# Patient Record
Sex: FEMALE | Race: WHITE | HISPANIC OR LATINO | Employment: FULL TIME | URBAN - METROPOLITAN AREA
[De-identification: names, ages, dates, MRNs, and addresses within clinical notes are randomized per-mention and may not be internally consistent; named-entity substitution may affect disease eponyms.]

---

## 2022-02-14 PROBLEM — Z86.0100 HISTORY OF COLON POLYPS: Status: ACTIVE | Noted: 2022-02-14

## 2024-07-15 ENCOUNTER — PATIENT MESSAGE (OUTPATIENT)
Dept: PODIATRY | Facility: CLINIC | Age: 59
End: 2024-07-15
Payer: COMMERCIAL

## 2024-07-17 ENCOUNTER — TELEPHONE (OUTPATIENT)
Dept: PODIATRY | Facility: CLINIC | Age: 59
End: 2024-07-17
Payer: COMMERCIAL

## 2024-07-17 ENCOUNTER — CLINICAL SUPPORT (OUTPATIENT)
Dept: REHABILITATION | Facility: HOSPITAL | Age: 59
End: 2024-07-17
Payer: COMMERCIAL

## 2024-07-17 DIAGNOSIS — Z74.09 IMPAIRED FUNCTIONAL MOBILITY AND ACTIVITY TOLERANCE: Primary | ICD-10-CM

## 2024-07-17 PROCEDURE — 97161 PT EVAL LOW COMPLEX 20 MIN: CPT | Mod: PN

## 2024-07-17 PROCEDURE — 97530 THERAPEUTIC ACTIVITIES: CPT | Mod: PN

## 2024-07-17 PROCEDURE — 97140 MANUAL THERAPY 1/> REGIONS: CPT | Mod: PN

## 2024-07-17 NOTE — PLAN OF CARE
[facility]   Physical Therapy Initial Evaluation      Name: Amanda Orellana  Clinic Number: [id number]    Therapy Diagnosis: No diagnosis found.     Physician: Tish Blue DPM    Physician Orders: PT Eval and Treat   Medical Diagnosis from Referral: M72.2 (ICD-10-CM) - Plantar fasciitis   Evaluation Date: 2024  Authorization Period Expiration: 2024  Plan of Care Expiration: 2024  Progress Note Due: 2024  Date of Surgery: none   Visit # / Visits authorized:    FOTO:     Precautions: Standard   *Video interpretor services neccessary*    Time In: 9:15 am   Time Out: 10:00 am   Total Billable Time: 45 minutes    Subjective     Date of onset: 6 months     History of current condition - Megan reports history of left foot pain for the past 6 months. Significant increase in left foot pain over the past month. Localizes pain to bilateral plantar aspect of heels (L>R). Gets pain with immediate weight-bearing in the morning. She wears [employer] tennis shoes with insoles at work and sandals at home. Walks around bare foot very little. Has tried exercises with no relief, only got relief when she came to therapy a year ago.     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     Medical History:   Past Medical History:   Diagnosis Date    Postmenopausal bleeding 2021    Thyroid disease        Surgical History:   Amanda Orellana  has a past surgical history that includes  section, low transverse; Cholecystectomy; and  "Axillary Surgery.    Medications:   Amanda has a current medication list which includes the following prescription(s): gabapentin, levothyroxine, mupirocin, and omeprazole.    Allergies:   Review of patient's allergies indicates:   Allergen Reactions    Vitamin a Rash        Objective        Palpation: TTP cacaneal tubercle bilateral; and left medial aspect of ankle.    Posture: Pes planus; calcaneal valgus     Gross Movement Analysis:  - Gait:  denied gait assessment due to pain    Range of Motion:   LE Right (Active/Passive) Left (Active/Passive)   Hip flexion WNL WNL   Hip abduction WNL WNL   Hip ER WNL WNL   Knee Flexion WNL WNL   Knee Extension WNL WNL     Ankle  Right  Left Pain/Dysfunction with Movement    AROM PROM AROM PROM WNL   Plantarflexion (50 deg) 50 NT 45 NT    Dorsiflexion (20 deg) 9 NT 10 NT    Inversion (20-30 deg) 43 NT 42 NT    Eversion (5-10 deg) 28 NT 28 NT        Lower Extremity Strength                 LE           Right           Left   Dorsiflexion 5/5 5/5   Plantarflexion 5/5 5/5   Inversion 4/5 4/5   Eversion  4+/5 4+/5   Great Toe 5/5 5/5     Special Tests:                             Right           Left   Single leg heel raise 13x 6x   Windlass Test  (-) (+)         Intake Outcome Measure for FOTO Foot Survey    Therapist reviewed FOTO scores for Amanda Orellana on 7/17/2024.   FOTO report - see Media section or FOTO account episode details.    Intake Score: 65%         Treatment     Total Treatment time (time-based codes) separate from Evaluation: 25 minutes     Megan received the treatments listed below:      therapeutic activities to improve functional performance for 15  minutes, including:  Gastroc stretch with towel: 3x30"  Soleus stretch: 3x30"   Plantar fascia stretch: 3x30"   Seated heel raises: 15x   Water bottle roll outs: demonstrated  Education on proper shoe wear, medial arch supports, and avoiding ambulating bare foot around home  Provided brands with orthotic " sandals for home use.    manual therapy techniques: Joint mobilizations and Soft tissue Mobilization were applied for 10 minutes, including:  fat pad and plantar fascia taping    Patient Education and Home Exercises     Education provided:   - Findings; prognosis and plan of care  - Home exercise program  - Modality options  - Therapist contact information      Written Home Exercises Provided: yes.  Exercises were reviewed and Megan was able to demonstrate them prior to the end of the session.  Megan demonstrated good understanding of the education provided. See EMR under Patient Instructions for exercises provided during therapy sessions.    Assessment     Amanda is a 59 y.o. female referred to outpatient Physical Therapy with a medical diagnosis of M72.2 (ICD-10-CM) - Plantar fasciitis. Patient presents with signs and symptoms consistent with medical diagnosis. All of today's session performed with the assistance of video interpretor.  Bilateral pes planus noted in stance. Increased daily job demands contributing to severity of pain levels despite appropriate shoe wear. Suggested over the counter medial arch supports. Milder pain levels following plantar fascia and fat pad taping.    Patient prognosis is Fair.   Patient will benefit from skilled outpatient Physical Therapy to address the deficits stated above and in the chart below, provide patient /family education, and to maximize patientt's level of independence.     Plan of care discussed with patient: yes  Patient's spiritual, cultural and educational needs considered and patient is agreeable to the plan of care and goals as stated below:     Anticipated Barriers for therapy: chronicity, job demands     Medical Necessity is demonstrated by the following  History  Co-morbidities and personal factors that may impact the plan of care [] LOW: no personal factors / co-morbidities  [x] MODERATE: 1-2 personal factors / co-morbidities  [] HIGH: 3+ personal factors /  co-morbidities    Moderate / High Support Documentation:   Co-morbidities affecting plan of care: thyroid disease     Personal Factors:   coping style  social background  lifestyle  character     Examination  Body Structures and Functions, activity limitations and participation restrictions that may impact the plan of care [] LOW: addressing 1-2 elements  [x] MODERATE: 3+ elements  [] HIGH: 4+ elements (please support below)    Moderate / High Support Documentation: Based on PMHX, co morbidities , data from assessments and functional level of assistance required with task and clinical presentation directly impacting function.         Clinical Presentation [x] LOW: stable  [] MODERATE: Evolving  [] HIGH: Unstable     Decision Making/ Complexity Score: low       Goals:  Short Term Goals (2 Weeks):  1. Patient will be compliant with home exercise program to supplement therapy in restoring pain free function.  2. Patient will improve impaired lower extremity manual muscle tests to >/= 4/5 to improve dynamic hip/knee support for functional tasks.  3. Patient will report daily use of medial arch supports to improve tolerance to standing/walking activities.  4. Patient will report pain upon rising in the morning </= 2/10 to improve quality of life.    Long Term Goals (4 Weeks):  1. Patient will improve FOTO score to </= 43% limited to decrease perceived limitation with mobility.   2. Patient will improve impaired lower extremity manual muscle tests to >/= 4+/5 to improve dynamic hip/knee support for functional tasks.  3. Patient will be able to complete 6 MWT within age-expected norms to return to prior level of function.  4. Patient will demonstrate single-leg heel raise > 10 reps without fatigue for improved standing/walking tolerance.      Plan     Plan of care Certification: 7/17/2024 to 8/14/2024.    Outpatient Physical Therapy 2 times weekly for 4 weeks to include the following interventions: Gait Training, Manual  Therapy, Moist Heat/ Ice, Neuromuscular Re-ed, Patient Education, Self Care, Therapeutic Activities, and Therapeutic Exercise.     Sola Singh, PT        Physician's Signature: _________________________________________ Date: ________________

## 2024-07-17 NOTE — TELEPHONE ENCOUNTER
Called patient to get her rescheduled for tmw patient said she will call back & reschedule at a different time

## 2024-07-17 NOTE — TELEPHONE ENCOUNTER
Attempted to reach Ms Orellana to inform her that Dr Blue will be in emergency surgery tomorrow, but she was unavailable. Left voice message encouraging call back with assistance to reschedule her appt.

## 2024-07-23 ENCOUNTER — LAB VISIT (OUTPATIENT)
Dept: LAB | Facility: HOSPITAL | Age: 59
End: 2024-07-23
Attending: FAMILY MEDICINE
Payer: COMMERCIAL

## 2024-07-23 ENCOUNTER — PATIENT MESSAGE (OUTPATIENT)
Dept: ADMINISTRATIVE | Facility: OTHER | Age: 59
End: 2024-07-23
Payer: COMMERCIAL

## 2024-07-23 ENCOUNTER — OFFICE VISIT (OUTPATIENT)
Dept: FAMILY MEDICINE | Facility: CLINIC | Age: 59
End: 2024-07-23
Payer: COMMERCIAL

## 2024-07-23 VITALS
WEIGHT: 172.63 LBS | DIASTOLIC BLOOD PRESSURE: 82 MMHG | SYSTOLIC BLOOD PRESSURE: 120 MMHG | BODY MASS INDEX: 32.59 KG/M2 | HEIGHT: 61 IN | OXYGEN SATURATION: 96 % | HEART RATE: 85 BPM

## 2024-07-23 DIAGNOSIS — Z00.01 ENCOUNTER FOR GENERAL ADULT MEDICAL EXAMINATION WITH ABNORMAL FINDINGS: ICD-10-CM

## 2024-07-23 DIAGNOSIS — Z12.31 ENCOUNTER FOR SCREENING MAMMOGRAM FOR BREAST CANCER: ICD-10-CM

## 2024-07-23 DIAGNOSIS — R14.0 ABDOMINAL BLOATING: ICD-10-CM

## 2024-07-23 DIAGNOSIS — Z86.010 HISTORY OF COLON POLYPS: ICD-10-CM

## 2024-07-23 DIAGNOSIS — R13.10 DYSPHAGIA, UNSPECIFIED TYPE: ICD-10-CM

## 2024-07-23 DIAGNOSIS — Z00.01 ENCOUNTER FOR GENERAL ADULT MEDICAL EXAMINATION WITH ABNORMAL FINDINGS: Primary | ICD-10-CM

## 2024-07-23 DIAGNOSIS — K21.9 GASTROESOPHAGEAL REFLUX DISEASE WITHOUT ESOPHAGITIS: ICD-10-CM

## 2024-07-23 DIAGNOSIS — E03.9 ACQUIRED HYPOTHYROIDISM: ICD-10-CM

## 2024-07-23 DIAGNOSIS — K59.09 CHRONIC CONSTIPATION: ICD-10-CM

## 2024-07-23 DIAGNOSIS — R73.03 PREDIABETES: ICD-10-CM

## 2024-07-23 DIAGNOSIS — Z12.11 COLON CANCER SCREENING: ICD-10-CM

## 2024-07-23 DIAGNOSIS — E66.09 CLASS 1 OBESITY DUE TO EXCESS CALORIES WITH SERIOUS COMORBIDITY AND BODY MASS INDEX (BMI) OF 32.0 TO 32.9 IN ADULT: ICD-10-CM

## 2024-07-23 DIAGNOSIS — E78.2 MODERATE MIXED HYPERLIPIDEMIA NOT REQUIRING STATIN THERAPY: ICD-10-CM

## 2024-07-23 LAB
BASOPHILS # BLD AUTO: 0.04 K/UL (ref 0–0.2)
BASOPHILS NFR BLD: 0.5 % (ref 0–1.9)
DIFFERENTIAL METHOD BLD: ABNORMAL
EOSINOPHIL # BLD AUTO: 0.2 K/UL (ref 0–0.5)
EOSINOPHIL NFR BLD: 2 % (ref 0–8)
ERYTHROCYTE [DISTWIDTH] IN BLOOD BY AUTOMATED COUNT: 13.2 % (ref 11.5–14.5)
ESTIMATED AVG GLUCOSE: 114 MG/DL (ref 68–131)
HBA1C MFR BLD: 5.6 % (ref 4–5.6)
HCT VFR BLD AUTO: 42.2 % (ref 37–48.5)
HGB BLD-MCNC: 13.3 G/DL (ref 12–16)
IMM GRANULOCYTES # BLD AUTO: 0.03 K/UL (ref 0–0.04)
IMM GRANULOCYTES NFR BLD AUTO: 0.4 % (ref 0–0.5)
LYMPHOCYTES # BLD AUTO: 2.3 K/UL (ref 1–4.8)
LYMPHOCYTES NFR BLD: 30.9 % (ref 18–48)
MCH RBC QN AUTO: 28.2 PG (ref 27–31)
MCHC RBC AUTO-ENTMCNC: 31.5 G/DL (ref 32–36)
MCV RBC AUTO: 89 FL (ref 82–98)
MONOCYTES # BLD AUTO: 0.8 K/UL (ref 0.3–1)
MONOCYTES NFR BLD: 10.3 % (ref 4–15)
NEUTROPHILS # BLD AUTO: 4.2 K/UL (ref 1.8–7.7)
NEUTROPHILS NFR BLD: 55.9 % (ref 38–73)
NRBC BLD-RTO: 0 /100 WBC
PLATELET # BLD AUTO: 296 K/UL (ref 150–450)
PMV BLD AUTO: 10.9 FL (ref 9.2–12.9)
RBC # BLD AUTO: 4.72 M/UL (ref 4–5.4)
WBC # BLD AUTO: 7.47 K/UL (ref 3.9–12.7)

## 2024-07-23 PROCEDURE — 84443 ASSAY THYROID STIM HORMONE: CPT | Performed by: FAMILY MEDICINE

## 2024-07-23 PROCEDURE — 3074F SYST BP LT 130 MM HG: CPT | Mod: CPTII,S$GLB,, | Performed by: FAMILY MEDICINE

## 2024-07-23 PROCEDURE — 83036 HEMOGLOBIN GLYCOSYLATED A1C: CPT | Performed by: FAMILY MEDICINE

## 2024-07-23 PROCEDURE — 99396 PREV VISIT EST AGE 40-64: CPT | Mod: S$GLB,,, | Performed by: FAMILY MEDICINE

## 2024-07-23 PROCEDURE — 85025 COMPLETE CBC W/AUTO DIFF WBC: CPT | Performed by: FAMILY MEDICINE

## 2024-07-23 PROCEDURE — 1159F MED LIST DOCD IN RCRD: CPT | Mod: CPTII,S$GLB,, | Performed by: FAMILY MEDICINE

## 2024-07-23 PROCEDURE — 80061 LIPID PANEL: CPT | Performed by: FAMILY MEDICINE

## 2024-07-23 PROCEDURE — 86364 TISS TRNSGLTMNASE EA IG CLAS: CPT | Mod: 59 | Performed by: FAMILY MEDICINE

## 2024-07-23 PROCEDURE — 36415 COLL VENOUS BLD VENIPUNCTURE: CPT | Mod: PO | Performed by: FAMILY MEDICINE

## 2024-07-23 PROCEDURE — 86258 DGP ANTIBODY EACH IG CLASS: CPT | Mod: 59 | Performed by: FAMILY MEDICINE

## 2024-07-23 PROCEDURE — 82784 ASSAY IGA/IGD/IGG/IGM EACH: CPT | Performed by: FAMILY MEDICINE

## 2024-07-23 PROCEDURE — 82247 BILIRUBIN TOTAL: CPT | Performed by: FAMILY MEDICINE

## 2024-07-23 PROCEDURE — 99213 OFFICE O/P EST LOW 20 MIN: CPT | Mod: 25,S$GLB,, | Performed by: FAMILY MEDICINE

## 2024-07-23 PROCEDURE — 80069 RENAL FUNCTION PANEL: CPT | Performed by: FAMILY MEDICINE

## 2024-07-23 PROCEDURE — 1160F RVW MEDS BY RX/DR IN RCRD: CPT | Mod: CPTII,S$GLB,, | Performed by: FAMILY MEDICINE

## 2024-07-23 PROCEDURE — 3079F DIAST BP 80-89 MM HG: CPT | Mod: CPTII,S$GLB,, | Performed by: FAMILY MEDICINE

## 2024-07-23 PROCEDURE — 99999 PR PBB SHADOW E&M-EST. PATIENT-LVL IV: CPT | Mod: PBBFAC,,, | Performed by: FAMILY MEDICINE

## 2024-07-23 PROCEDURE — 3008F BODY MASS INDEX DOCD: CPT | Mod: CPTII,S$GLB,, | Performed by: FAMILY MEDICINE

## 2024-07-23 PROCEDURE — 84460 ALANINE AMINO (ALT) (SGPT): CPT | Performed by: FAMILY MEDICINE

## 2024-07-23 RX ORDER — OMEPRAZOLE 40 MG/1
40 CAPSULE, DELAYED RELEASE ORAL DAILY
Qty: 90 CAPSULE | Refills: 1 | Status: SHIPPED | OUTPATIENT
Start: 2024-07-23

## 2024-07-23 RX ORDER — ERGOCALCIFEROL 1.25 MG/1
50000 CAPSULE ORAL
COMMUNITY
Start: 2024-06-17

## 2024-07-23 RX ORDER — PLECANATIDE 3 MG/1
3 TABLET ORAL DAILY
Qty: 90 TABLET | Refills: 3 | Status: SHIPPED | OUTPATIENT
Start: 2024-07-23

## 2024-07-23 NOTE — PROGRESS NOTES
"Subjective:         Patient ID: Megan Hurtado is a 59 y.o. female.    Chief Complaint: Abdominal Pain and Insomnia    Patient Active Problem List   Diagnosis    Acquired hypothyroidism    GERD without esophagitis    History of colon polyps    Prediabetes    Moderate mixed hyperlipidemia not requiring statin therapy    Adjustment disorder with depressed mood    Female pattern hair loss    History of basal cell carcinoma (BCC) of skin    Chronic bilateral low back pain with right-sided sciatica    Calcaneal spur of foot, right    Right foot pain    Impaired gait and mobility    Impaired functional mobility and activity tolerance      Abdominal Pain      Megan is a 59 y.o. female who presents today for annual exam.   Reports that she has epigastric pain and bloating starting in the morning after drinking liquids for the past 5 months. Reports sometimes trouble swallowing. Prior EGD unremarkable in the remote past.   Fullness and bloating and just feeling like her abd pannus is larger and more pronounced.   S/p cholecystectomy    Review of Systems   Gastrointestinal:  Positive for abdominal pain.   All other systems reviewed and are negative.       Objective:     Vitals:    07/23/24 1105   BP: 120/82   BP Location: Left arm   Patient Position: Sitting   BP Method: Large (Manual)   Pulse: 85   SpO2: 96%   Weight: 78.3 kg (172 lb 9.9 oz)   Height: 5' 1" (1.549 m)         Physical Exam  Vitals and nursing note reviewed.   Constitutional:       General: She is not in acute distress.     Appearance: Normal appearance. She is not ill-appearing, toxic-appearing or diaphoretic.   HENT:      Head: Normocephalic and atraumatic.   Eyes:      General: No scleral icterus.     Conjunctiva/sclera: Conjunctivae normal.   Cardiovascular:      Rate and Rhythm: Normal rate.      Heart sounds: Normal heart sounds. No murmur heard.  Pulmonary:      Effort: Pulmonary effort is normal. No respiratory distress.      Breath sounds: Normal breath " sounds.   Abdominal:      General: Bowel sounds are normal.   Skin:     Coloration: Skin is not pale.   Neurological:      Mental Status: She is alert. Mental status is at baseline.   Psychiatric:         Attention and Perception: Attention and perception normal.         Mood and Affect: Mood and affect normal.         Speech: Speech normal.         Behavior: Behavior normal.         Cognition and Memory: Cognition and memory normal.         Judgment: Judgment normal.       Assessment:       1. Encounter for general adult medical examination with abnormal findings    2. Moderate mixed hyperlipidemia not requiring statin therapy    3. Gastroesophageal reflux disease without esophagitis    4. Dysphagia, unspecified type    5. Abdominal bloating    6. Chronic constipation    7. Prediabetes    8. Acquired hypothyroidism    9. History of colon polyps    10. Colon cancer screening    11. Encounter for screening mammogram for breast cancer    12. Class 1 obesity due to excess calories with serious comorbidity and body mass index (BMI) of 32.0 to 32.9 in adult          Plan:   Recent relevant labs results reviewed with patient.         1. Encounter for general adult medical examination with abnormal findings  -     Hepatic Function Panel; Future; Expected date: 07/23/2024  -     Renal Function Panel; Future; Expected date: 07/23/2024  -     Lipid Panel; Future; Expected date: 07/23/2024  -     TSH; Future; Expected date: 07/23/2024  -     Hemoglobin A1C; Future; Expected date: 07/23/2024  -     CBC Auto Differential; Future; Expected date: 07/23/2024  -     Hepatic Function Panel; Future; Expected date: 07/23/2025  -     Renal Function Panel; Future; Expected date: 07/23/2025  -     Lipid Panel; Future; Expected date: 07/23/2025  -     TSH; Future; Expected date: 07/23/2025  -     Hemoglobin A1C; Future; Expected date: 07/23/2025  -     CBC Auto Differential; Future; Expected date: 07/23/2025  - Risk and age appropriate  anticipatory guidance.  reviewed and updated. Recommendations discussed with patient as appropriate.     2. Moderate mixed hyperlipidemia not requiring statin therapy  -     Lipid Panel; Future; Expected date: 07/23/2025  Recheck    3. Gastroesophageal reflux disease without esophagitis  -     omeprazole (PRILOSEC) 40 MG capsule; Take 1 capsule (40 mg total) by mouth once daily.  Dispense: 90 capsule; Refill: 1  -     Ambulatory referral/consult to Gastroenterology; Future; Expected date: 07/30/2024  Increase to 40 mg    4. Dysphagia, unspecified type  -     omeprazole (PRILOSEC) 40 MG capsule; Take 1 capsule (40 mg total) by mouth once daily.  Dispense: 90 capsule; Refill: 1  -     Ambulatory referral/consult to Gastroenterology; Future; Expected date: 07/30/2024    5. Abdominal bloating  -     omeprazole (PRILOSEC) 40 MG capsule; Take 1 capsule (40 mg total) by mouth once daily.  Dispense: 90 capsule; Refill: 1  -     Celiac Disease Panel; Future; Expected date: 07/23/2024    6. Chronic constipation  -     plecanatide (TRULANCE) 3 mg Tab; Take 3 mg by mouth once daily. For chronic constipation  Dispense: 90 tablet; Refill: 3    7. Prediabetes  -     Hemoglobin A1C; Future; Expected date: 07/23/2025    8. Acquired hypothyroidism  -     TSH; Future; Expected date: 07/23/2025    9. History of colon polyps  10. Colon cancer screening  May need repeat colonoscopy    11. Encounter for screening mammogram for breast cancer  -     Mammo Digital Screening Bilat w/ Margarito; Future; Expected date: 07/23/2024    Patient's questions answered. Plan reviewed with patient at the end of visit. Relevant precautions to chief complaint and reasons to seek further medical care or to contact the office sooner reviewed with patient.     Follow up in about 1 year (around 7/23/2025) for Annual Exam (prelabs).

## 2024-07-24 ENCOUNTER — CLINICAL SUPPORT (OUTPATIENT)
Dept: REHABILITATION | Facility: HOSPITAL | Age: 59
End: 2024-07-24
Payer: COMMERCIAL

## 2024-07-24 ENCOUNTER — TELEPHONE (OUTPATIENT)
Dept: GASTROENTEROLOGY | Facility: CLINIC | Age: 59
End: 2024-07-24
Payer: COMMERCIAL

## 2024-07-24 DIAGNOSIS — R26.89 IMPAIRED GAIT AND MOBILITY: Primary | ICD-10-CM

## 2024-07-24 DIAGNOSIS — Z74.09 IMPAIRED FUNCTIONAL MOBILITY AND ACTIVITY TOLERANCE: ICD-10-CM

## 2024-07-24 LAB
ALBUMIN SERPL BCP-MCNC: 4 G/DL (ref 3.5–5.2)
ALBUMIN SERPL BCP-MCNC: 4 G/DL (ref 3.5–5.2)
ALP SERPL-CCNC: 86 U/L (ref 55–135)
ALT SERPL W/O P-5'-P-CCNC: 19 U/L (ref 10–44)
ANION GAP SERPL CALC-SCNC: 8 MMOL/L (ref 8–16)
AST SERPL-CCNC: 21 U/L (ref 10–40)
BILIRUB DIRECT SERPL-MCNC: 0.1 MG/DL (ref 0.1–0.3)
BILIRUB SERPL-MCNC: 0.3 MG/DL (ref 0.1–1)
BUN SERPL-MCNC: 6 MG/DL (ref 6–20)
CALCIUM SERPL-MCNC: 9.1 MG/DL (ref 8.7–10.5)
CHLORIDE SERPL-SCNC: 104 MMOL/L (ref 95–110)
CHOLEST SERPL-MCNC: 180 MG/DL (ref 120–199)
CHOLEST/HDLC SERPL: 4.4 {RATIO} (ref 2–5)
CO2 SERPL-SCNC: 28 MMOL/L (ref 23–29)
CREAT SERPL-MCNC: 0.7 MG/DL (ref 0.5–1.4)
EST. GFR  (NO RACE VARIABLE): >60 ML/MIN/1.73 M^2
GLUCOSE SERPL-MCNC: 86 MG/DL (ref 70–110)
HDLC SERPL-MCNC: 41 MG/DL (ref 40–75)
HDLC SERPL: 22.8 % (ref 20–50)
LDLC SERPL CALC-MCNC: 93 MG/DL (ref 63–159)
NONHDLC SERPL-MCNC: 139 MG/DL
PHOSPHATE SERPL-MCNC: 3.6 MG/DL (ref 2.7–4.5)
POTASSIUM SERPL-SCNC: 4.1 MMOL/L (ref 3.5–5.1)
PROT SERPL-MCNC: 7.4 G/DL (ref 6–8.4)
SODIUM SERPL-SCNC: 140 MMOL/L (ref 136–145)
TRIGL SERPL-MCNC: 230 MG/DL (ref 30–150)
TSH SERPL DL<=0.005 MIU/L-ACNC: 1.84 UIU/ML (ref 0.4–4)

## 2024-07-24 PROCEDURE — 97110 THERAPEUTIC EXERCISES: CPT | Mod: PN

## 2024-07-24 PROCEDURE — 97112 NEUROMUSCULAR REEDUCATION: CPT | Mod: PN

## 2024-07-24 NOTE — TELEPHONE ENCOUNTER
"Left VM for patient to call the office back.       ----- Message from China Hernández sent at 7/24/2024  1:38 PM CDT -----  Good afternoon  Patient with a referral to Gastroenterology from Dr Mary Hart.  Diagnosis "Gastroesophageal reflux disease without esophagitis [K21.9]Dysphagia, unspecified type "  Patient ep with Dr Conn, please assist scheduling patient  Thanks    [place]  "

## 2024-07-24 NOTE — PROGRESS NOTES
"[facility]   Physical Therapy Treatment Note      Name: Megan Hurtado  Clinic Number: [id number]    Therapy Diagnosis:   Encounter Diagnoses   Name Primary?    Impaired gait and mobility Yes    Impaired functional mobility and activity tolerance      Physician: Tish Blue DPM    Visit Date: 7/24/2024    Physician Orders: PT Eval and Treat   Medical Diagnosis from Referral: M72.2 (ICD-10-CM) - Plantar fasciitis   Evaluation Date: 7/17/2024  Authorization Period Expiration: 12/31/2024  Plan of Care Expiration: 8/14/2024  Progress Note Due: 7/31/2024  Date of Surgery: none   Visit # / Visits authorized: 1/ 20   FOTO: 1/5     Precautions: Standard   *Video interpretor services neccessary*     Time In: 5:00 pm   Time Out: 5: 55 pm   Total Billable Time: 55 minutes  PTA Visit #: 0/5       Subjective     Patient reports: Feeling slightly better since initial evaluation. Medial arch supports helping in work shoes. Wants to get another pain for her [employer] tennis shoes.     She was compliant with home exercise program.  Response to previous treatment: initial evaluation last visit   Functional change: ongoing     Pain: 5/10  Location: left heel and plantar fascia     Objective      Objective Measures updated at progress report unless specified.     Treatment     Megan received the treatments listed below:      therapeutic exercises to develop strength, endurance, ROM, and flexibility for 23 minutes including:  Lacrosse ball roll outs: 1 minute - left   Gastroc stretch: 5x30" left   Soleus stretch:5x30"  Towel scrunches: 2 minutes     manual therapy techniques: taping were applied for 3 minutes, including:    fat pad and plantar fascia taping     neuromuscular re-education activities to improve: Coordination, Kinesthetic, Sense, and Proprioception for 29 minutes. The following activities were included:    Arch domes: 20x5"   Ankle plantar flexion: blue theraband 3x10  Figure 4 ankle inversion: " 3x10   Heel raises: 3x10   Tennis ball heel raises: 3x10  Cybex leg press: 7 plates 3x10    Patient Education and Home Exercises       Education provided:   - compliance with home exercise program   - avoid bare foot in home     Written Home Exercises Provided: yes. Exercises were reviewed and Megan was able to demonstrate them prior to the end of the session.  Megan demonstrated good  understanding of the education provided. See Electronic Medical Record under Patient Instructions for exercises provided during therapy sessions    Assessment   Amanda is a 59 y.o. female referred to outpatient Physical Therapy with a medical diagnosis of M72.2 (ICD-10-CM) - Plantar fasciitis. Patient presents with signs and symptoms consistent with medical diagnosis. Slight improvement in left heel pain with addition of medial arch support in work shoe wear. First follow up consisted of foot intrinsic neuromuscular control, anterior tibialis strengthening, and gastroc-soleus strengthening. Good tolerance throughout. Plantar fascia and fat pad taping technique applied end of session.    Megan Is progressing well towards her goals.   Patient prognosis is Fair.     Patient will continue to benefit from skilled outpatient physical therapy to address the deficits listed in the problem list box on initial evaluation, provide pt/family education and to maximize pt's level of independence in the home and community environment.     Patient's spiritual, cultural and educational needs considered and pt agreeable to plan of care and goals.     Anticipated barriers to physical therapy: chronicity, job demands     Goals:   Short Term Goals (2 Weeks):  1. Patient will be compliant with home exercise program to supplement therapy in restoring pain free function.  2. Patient will improve impaired lower extremity manual muscle tests to >/= 4/5 to improve dynamic hip/knee support for functional tasks.  3. Patient will report daily use of medial arch  supports to improve tolerance to standing/walking activities.  4. Patient will report pain upon rising in the morning </= 2/10 to improve quality of life.     Long Term Goals (4 Weeks):  1. Patient will improve FOTO score to </= 43% limited to decrease perceived limitation with mobility.   2. Patient will improve impaired lower extremity manual muscle tests to >/= 4+/5 to improve dynamic hip/knee support for functional tasks.  3. Patient will be able to complete 6 MWT within age-expected norms to return to prior level of function.  4. Patient will demonstrate single-leg heel raise > 10 reps without fatigue for improved standing/walking tolerance.      Plan     taping prn  Triad PF stretches (gastroc, soleus, PF)  Foot intrinsic neuromuscular control   gastroc-soleus strengthening     Sola Singh, PT

## 2024-07-26 LAB
GLIADIN PEPTIDE IGA SER-ACNC: 3.4 U/ML
GLIADIN PEPTIDE IGG SER-ACNC: <0.6 U/ML
IGA SERPL-MCNC: 382 MG/DL (ref 70–400)
TTG IGA SER-ACNC: 0.9 U/ML
TTG IGG SER-ACNC: 0.6 U/ML

## 2024-07-30 ENCOUNTER — CLINICAL SUPPORT (OUTPATIENT)
Dept: REHABILITATION | Facility: HOSPITAL | Age: 59
End: 2024-07-30
Payer: COMMERCIAL

## 2024-07-30 DIAGNOSIS — Z74.09 IMPAIRED FUNCTIONAL MOBILITY AND ACTIVITY TOLERANCE: ICD-10-CM

## 2024-07-30 DIAGNOSIS — R26.89 IMPAIRED GAIT AND MOBILITY: Primary | ICD-10-CM

## 2024-07-30 PROCEDURE — 97140 MANUAL THERAPY 1/> REGIONS: CPT | Mod: PN,CQ

## 2024-07-30 PROCEDURE — 97110 THERAPEUTIC EXERCISES: CPT | Mod: PN,CQ

## 2024-07-30 PROCEDURE — 97112 NEUROMUSCULAR REEDUCATION: CPT | Mod: PN,CQ

## 2024-07-30 NOTE — PROGRESS NOTES
"[facility]   Physical Therapy Treatment Note      Name: Megan Hurtado  Clinic Number: [id number]    Therapy Diagnosis:   Encounter Diagnoses   Name Primary?    Impaired gait and mobility Yes    Impaired functional mobility and activity tolerance          Physician: Tish Blue DPM    Visit Date: 7/30/2024    Physician Orders: PT Eval and Treat   Medical Diagnosis from Referral: M72.2 (ICD-10-CM) - Plantar fasciitis   Evaluation Date: 7/17/2024  Authorization Period Expiration: 12/31/2024  Plan of Care Expiration: 8/14/2024  Progress Note Due: 7/31/2024  Date of Surgery: none   Visit # / Visits authorized: 1/ 20   FOTO: 1/5     Precautions: Standard   *Video interpretor services neccessary*     Time In: 3:10 pm  (late arrival)  Time Out: 4:00 pm   Total Billable Time: 50 minutes  PTA Visit #: 1/5       Subjective     Patient reports: Continues to wear arch supports in work shoes  [employer] tennis shoes.     She was compliant with home exercise program.  Response to previous treatment: tolerated well  Functional change: ongoing     Pain: 4/10  Location: left heel and plantar fascia     Objective      Objective Measures updated at progress report unless specified.     Treatment     Megan received the treatments listed below:      therapeutic exercises to develop strength, endurance, ROM, and flexibility for 15 minutes including: Additional time supervised   Lacrosse ball roll outs: 1 minute - left   Gastroc stretch: 5x30" left   Soleus stretch:5x30"  Towel scrunches: 2 minutes     manual therapy techniques: taping were applied for 5 minutes, including:    Hawk  to plantar fascia and gastroc soleus   fat pad and plantar fascia taping NP    neuromuscular re-education activities to improve: Coordination, Kinesthetic, Sense, and Proprioception for 15 minutes. The following activities were included:Additional time supervised     Arch domes: 20x5"   Ankle plantar flexion: blue theraband " 3x10  Figure 4 ankle inversion: 3x10 green theraband   Heel raises: 3x10   Tennis ball heel raises: 3x10  Cybex leg press: 7 plates 3x10    Patient Education and Home Exercises       Education provided:   - compliance with home exercise program   - avoid bare foot in home     Written Home Exercises Provided: yes. Exercises were reviewed and Megan was able to demonstrate them prior to the end of the session.  Megan demonstrated good  understanding of the education provided. See Electronic Medical Record under Patient Instructions for exercises provided during therapy sessions    Assessment   Amanda is a 59 y.o. female referred to outpatient Physical Therapy with a medical diagnosis of M72.2 (ICD-10-CM) - Plantar fasciitis. Patient presents with signs and symptoms consistent with medical diagnosis. Slight improvement in left heel pain with addition of medial arch support in work shoe wear. First follow up consisted of foot intrinsic neuromuscular control, anterior tibialis strengthening, and gastroc-soleus strengthening. Good tolerance throughout. Added Hawk  STM with good relief. Reports 1 point pain decrease following interventions.     Megan Is progressing well towards her goals.   Patient prognosis is Fair.     Patient will continue to benefit from skilled outpatient physical therapy to address the deficits listed in the problem list box on initial evaluation, provide pt/family education and to maximize pt's level of independence in the home and community environment.     Patient's spiritual, cultural and educational needs considered and pt agreeable to plan of care and goals.     Anticipated barriers to physical therapy: chronicity, job demands     Goals:   Short Term Goals (2 Weeks):  1. Patient will be compliant with home exercise program to supplement therapy in restoring pain free function.  2. Patient will improve impaired lower extremity manual muscle tests to >/= 4/5 to improve dynamic hip/knee  support for functional tasks.  3. Patient will report daily use of medial arch supports to improve tolerance to standing/walking activities.  4. Patient will report pain upon rising in the morning </= 2/10 to improve quality of life.     Long Term Goals (4 Weeks):  1. Patient will improve FOTO score to </= 43% limited to decrease perceived limitation with mobility.   2. Patient will improve impaired lower extremity manual muscle tests to >/= 4+/5 to improve dynamic hip/knee support for functional tasks.  3. Patient will be able to complete 6 MWT within age-expected norms to return to prior level of function.  4. Patient will demonstrate single-leg heel raise > 10 reps without fatigue for improved standing/walking tolerance.      Plan     taping prn  Triad PF stretches (gastroc, soleus, PF)  Foot intrinsic neuromuscular control   gastroc-soleus strengthening     Mathieu Lala PTA

## 2024-08-06 ENCOUNTER — CLINICAL SUPPORT (OUTPATIENT)
Dept: REHABILITATION | Facility: HOSPITAL | Age: 59
End: 2024-08-06
Payer: COMMERCIAL

## 2024-08-06 DIAGNOSIS — Z74.09 IMPAIRED FUNCTIONAL MOBILITY AND ACTIVITY TOLERANCE: ICD-10-CM

## 2024-08-06 DIAGNOSIS — R26.89 IMPAIRED GAIT AND MOBILITY: Primary | ICD-10-CM

## 2024-08-06 PROCEDURE — 97112 NEUROMUSCULAR REEDUCATION: CPT | Mod: PN,CQ

## 2024-08-06 PROCEDURE — 97110 THERAPEUTIC EXERCISES: CPT | Mod: PN,CQ

## 2024-08-06 PROCEDURE — 97140 MANUAL THERAPY 1/> REGIONS: CPT | Mod: PN,CQ

## 2024-08-08 ENCOUNTER — CLINICAL SUPPORT (OUTPATIENT)
Dept: REHABILITATION | Facility: HOSPITAL | Age: 59
End: 2024-08-08
Payer: COMMERCIAL

## 2024-08-08 DIAGNOSIS — R26.89 IMPAIRED GAIT AND MOBILITY: Primary | ICD-10-CM

## 2024-08-08 DIAGNOSIS — Z74.09 IMPAIRED FUNCTIONAL MOBILITY AND ACTIVITY TOLERANCE: ICD-10-CM

## 2024-08-08 PROCEDURE — 97112 NEUROMUSCULAR REEDUCATION: CPT | Mod: PN,CQ

## 2024-08-08 PROCEDURE — 97110 THERAPEUTIC EXERCISES: CPT | Mod: PN,CQ

## 2024-08-14 ENCOUNTER — CLINICAL SUPPORT (OUTPATIENT)
Dept: REHABILITATION | Facility: HOSPITAL | Age: 59
End: 2024-08-14
Payer: COMMERCIAL

## 2024-08-14 DIAGNOSIS — R26.89 IMPAIRED GAIT AND MOBILITY: Primary | ICD-10-CM

## 2024-08-14 DIAGNOSIS — Z74.09 IMPAIRED FUNCTIONAL MOBILITY AND ACTIVITY TOLERANCE: ICD-10-CM

## 2024-08-14 PROCEDURE — 97112 NEUROMUSCULAR REEDUCATION: CPT | Mod: PN

## 2024-08-14 PROCEDURE — 97140 MANUAL THERAPY 1/> REGIONS: CPT | Mod: PN

## 2024-08-14 NOTE — PROGRESS NOTES
[facility]   Physical Therapy Treatment Note      Name: Megan Hurtado  Clinic Number: [id number]    Therapy Diagnosis:   Encounter Diagnoses   Name Primary?    Impaired gait and mobility Yes    Impaired functional mobility and activity tolerance        Physician: Tish Blue DPM    Visit Date: 8/14/2024    Physician Orders: PT Eval and Treat   Medical Diagnosis from Referral: M72.2 (ICD-10-CM) - Plantar fasciitis   Evaluation Date: 7/17/2024  Authorization Period Expiration: 12/31/2024  Plan of Care Expiration: 8/14/2024  Progress Note Due: 7/31/2024  Date of Surgery: none   Visit # / Visits authorized: 5/ 20   FOTO: 5/5 NEXT     Precautions: Standard   *Video interpretor services neccessary*     Time In: 5:00 pm    Time Out: 5:55 pm   Total Billable Time: 55 minutes  PTA Visit #: 1/5       Subjective     Patient reports: left plantar surface of foot was getting a little better but pain came back a lot today. Pain at the end of the day slightly better than beginning of session. She is scared to stand today cause she may hurt herself     She was compliant with home exercise program.  Response to previous treatment: improved treatment for one day.   Functional change: fluctuations in pain levels    Pain: 10/10  Location: left heel     Objective      Objective Measures updated at progress report unless specified.     Treatment     Megan received the treatments listed below:        manual therapy techniques: taping were applied for 25 minutes, including:   Hawk  to plantar fascia and gastroc soleus   Fat pad and plantar fascia taping    neuromuscular re-education activities to improve: Coordination, Kinesthetic, Sense, and Proprioception for 30 minutes. The following activities were included: Additional time supervised     dL standing heel raises: 3x10   Figure 4 ankle inversion: 3x10 green theraband - left   Seated heel raise: on oval - 30# kettle bell 2x15 bilateral   Shuttle  press: eccentric dL heel raise 2x15  Cybex hamstring curls: 3 plates 2x10  Cybex knee extensions: 30# 2x10      NEXT: Inclined squats off ramp at // bars         Patient Education and Home Exercises       Education provided:   - compliance with home exercise program   - avoid bare foot in home     Written Home Exercises Provided: yes. Exercises were reviewed and Megan was able to demonstrate them prior to the end of the session.  Megan demonstrated good  understanding of the education provided. See Electronic Medical Record under Patient Instructions for exercises provided during therapy sessions    Assessment   Amanda is a 59 y.o. female referred to outpatient Physical Therapy with a medical diagnosis of M72.2 (ICD-10-CM) - Plantar fasciitis. Patient presents with signs and symptoms consistent with medical diagnosis. Regression noted in left heel pain. Highly irritable and painful today. Significant tenderness upon soft tissue mobilization to calf musculature. Continued plantar fascia and fat pad taping to reduce load through plantar fascia and fat pad. Noted relief. Standing > 10 hours during job is a significant contributor to continued high pain levels. Focused on seated and machine resistance training.    Megan Is progressing well towards her goals.   Patient prognosis is Fair.     Patient will continue to benefit from skilled outpatient physical therapy to address the deficits listed in the problem list box on initial evaluation, provide pt/family education and to maximize pt's level of independence in the home and community environment.     Patient's spiritual, cultural and educational needs considered and pt agreeable to plan of care and goals.     Anticipated barriers to physical therapy: chronicity, job demands     Goals:   Short Term Goals (2 Weeks):  1. Patient will be compliant with home exercise program to supplement therapy in restoring pain free function.  2. Patient will improve impaired lower  extremity manual muscle tests to >/= 4/5 to improve dynamic hip/knee support for functional tasks.  3. Patient will report daily use of medial arch supports to improve tolerance to standing/walking activities.  4. Patient will report pain upon rising in the morning </= 2/10 to improve quality of life.     Long Term Goals (4 Weeks):  1. Patient will improve FOTO score to </= 43% limited to decrease perceived limitation with mobility.   2. Patient will improve impaired lower extremity manual muscle tests to >/= 4+/5 to improve dynamic hip/knee support for functional tasks.  3. Patient will be able to complete 6 MWT within age-expected norms to return to prior level of function.  4. Patient will demonstrate single-leg heel raise > 10 reps without fatigue for improved standing/walking tolerance.      Plan     taping prn  Triad PF stretches (gastroc, soleus, PF)  Foot intrinsic neuromuscular control   gastroc-soleus strengthening     Sola Singh, PT

## 2024-08-20 ENCOUNTER — OFFICE VISIT (OUTPATIENT)
Dept: GASTROENTEROLOGY | Facility: CLINIC | Age: 59
End: 2024-08-20
Payer: COMMERCIAL

## 2024-08-20 ENCOUNTER — TELEPHONE (OUTPATIENT)
Dept: ENDOSCOPY | Facility: HOSPITAL | Age: 59
End: 2024-08-20
Payer: COMMERCIAL

## 2024-08-20 VITALS
DIASTOLIC BLOOD PRESSURE: 74 MMHG | WEIGHT: 173.75 LBS | SYSTOLIC BLOOD PRESSURE: 116 MMHG | HEART RATE: 85 BPM | HEIGHT: 62 IN | BODY MASS INDEX: 31.97 KG/M2

## 2024-08-20 DIAGNOSIS — Z86.19 HISTORY OF HELICOBACTER PYLORI INFECTION: ICD-10-CM

## 2024-08-20 DIAGNOSIS — K59.04 CHRONIC IDIOPATHIC CONSTIPATION: ICD-10-CM

## 2024-08-20 DIAGNOSIS — R13.10 DYSPHAGIA, UNSPECIFIED TYPE: ICD-10-CM

## 2024-08-20 DIAGNOSIS — Z12.11 SCREENING FOR COLON CANCER: Primary | ICD-10-CM

## 2024-08-20 DIAGNOSIS — R10.9 ABDOMINAL PAIN, UNSPECIFIED ABDOMINAL LOCATION: ICD-10-CM

## 2024-08-20 DIAGNOSIS — K21.9 GASTROESOPHAGEAL REFLUX DISEASE WITHOUT ESOPHAGITIS: ICD-10-CM

## 2024-08-20 DIAGNOSIS — Z86.010 HISTORY OF COLON POLYPS: Primary | ICD-10-CM

## 2024-08-20 DIAGNOSIS — K21.9 GASTROESOPHAGEAL REFLUX DISEASE, UNSPECIFIED WHETHER ESOPHAGITIS PRESENT: ICD-10-CM

## 2024-08-20 DIAGNOSIS — R14.0 ABDOMINAL DISTENSION: ICD-10-CM

## 2024-08-20 DIAGNOSIS — R13.19 ESOPHAGEAL DYSPHAGIA: Primary | ICD-10-CM

## 2024-08-20 DIAGNOSIS — K59.00 CONSTIPATION, UNSPECIFIED CONSTIPATION TYPE: ICD-10-CM

## 2024-08-20 DIAGNOSIS — Z86.010 HISTORY OF COLON POLYPS: ICD-10-CM

## 2024-08-20 PROCEDURE — 3074F SYST BP LT 130 MM HG: CPT | Mod: CPTII,S$GLB,,

## 2024-08-20 PROCEDURE — 1160F RVW MEDS BY RX/DR IN RCRD: CPT | Mod: CPTII,S$GLB,,

## 2024-08-20 PROCEDURE — 3078F DIAST BP <80 MM HG: CPT | Mod: CPTII,S$GLB,,

## 2024-08-20 PROCEDURE — 99999 PR PBB SHADOW E&M-EST. PATIENT-LVL IV: CPT | Mod: PBBFAC,,,

## 2024-08-20 PROCEDURE — 3044F HG A1C LEVEL LT 7.0%: CPT | Mod: CPTII,S$GLB,,

## 2024-08-20 PROCEDURE — 1159F MED LIST DOCD IN RCRD: CPT | Mod: CPTII,S$GLB,,

## 2024-08-20 PROCEDURE — 99214 OFFICE O/P EST MOD 30 MIN: CPT | Mod: S$GLB,,,

## 2024-08-20 PROCEDURE — 3008F BODY MASS INDEX DOCD: CPT | Mod: CPTII,S$GLB,,

## 2024-08-20 RX ORDER — FAMOTIDINE 40 MG/1
40 TABLET, FILM COATED ORAL DAILY
Qty: 30 TABLET | Refills: 11 | Status: SHIPPED | OUTPATIENT
Start: 2024-08-20 | End: 2025-08-20

## 2024-08-20 RX ORDER — POLYETHYLENE GLYCOL 3350, SODIUM SULFATE ANHYDROUS, SODIUM BICARBONATE, SODIUM CHLORIDE, POTASSIUM CHLORIDE 236; 22.74; 6.74; 5.86; 2.97 G/4L; G/4L; G/4L; G/4L; G/4L
4 POWDER, FOR SOLUTION ORAL ONCE
Qty: 4000 ML | Refills: 0 | Status: SHIPPED | OUTPATIENT
Start: 2024-08-20 | End: 2024-08-20

## 2024-08-20 NOTE — TELEPHONE ENCOUNTER
Spoke to PT to schedule procedure(s) Colonoscopy/EGD       Physician to perform procedure(s) Dr. GUIDO Campbell  Date of Procedure (s) 8/26/24  Arrival Time 1:00 PM  Time of Procedure(s) 2:00 PM   Location of Procedure(s) [facility]nd Floor  Type of Rx Prep sent to patient: PEG  Instructions provided to patient via [facility]    Patient was informed on the following information and verbalized understanding. Screening questionnaire reviewed with patient and complete. If procedure requires anesthesia, a responsible adult needs to be present to accompany the patient home, patient cannot drive after receiving anesthesia. Appointment details are tentative, especially check-in time. Patient will receive a prep-op call 7 days prior to confirm check-in time for procedure. If applicable the patient should contact their pharmacy to verify Rx for procedure prep is ready for pick-up. Patient was advised to call the scheduling department at [phone] if pharmacy states no Rx is available. Patient was advised to call the endoscopy scheduling department if any questions or concerns arise.      [facility] Scheduling Department

## 2024-08-20 NOTE — PROGRESS NOTES
"GENERAL GI PATIENT INTAKE:    COVID symptoms in the last 7 days (runny nose, sore throat, congestion, cough, fever): No  PCP: Mary Hart  If not PCP-  number given to establish [phone]: N/A    ALLERGIES REVIEWED:  Yes    CHIEF COMPLAINT:  No chief complaint on file.      VITAL SIGNS:  /74   Pulse 85   Ht 5' 2" (1.575 m)   Wt 78.8 kg (173 lb 11.6 oz)   BMI 31.77 kg/m²      Change in medical, surgical, family or social history: No      REVIEWED MEDICATION LIST RECONCILED INCLUDING ABOVE MEDS:  Yes     "

## 2024-08-20 NOTE — PATIENT INSTRUCTIONS
GERD Treatment: Lifestyle and Dietary Changes    Dietary and lifestyle changes are the first step in treating GERD. Certain foods make the reflux worse. Suggestions to help alleviate symptoms include:  Lose weight if you are overweight -- of all of the lifestyle changes you can make, this one is the most effective.  Avoid foods that increase the level of acid in your stomach, including caffeinated beverages.  Avoid foods that decrease the pressure in the lower esophagus, such as fatty foods, alcohol and peppermint.  Avoid foods that affect peristalsis (the muscle movements in your digestive tract), such as coffee, alcohol and acidic liquids.  Avoid foods that slow gastric emptying, including fatty foods.  Avoid large meals.  Quit smoking.  Do not lie down immediately after a meal.  Elevate the level of your head when you lie down.    Foods That May Cause Heartburn  Foods commonly known to be heartburn triggers cause the esophageal sphincter to relax and delay the digestive process, letting food sit in the stomach longer, says Bharath. The worst culprits? Foods that are high in fat, salt or spice such as:  Fried food  Fast food  Pizza  Potato chips and other processed snacks  Chili powder and pepper (white, black, cayenne)  Fatty meats such as rachel and sausage  Cheese  Other foods that can cause the same problem include:  Tomato-based sauces  Citrus fruits  Chocolate  Peppermint  Carbonated beverages    Moderation is key since many people may not be able to or want to completely eliminate these foods. But try to avoid eating problem foods late in the evening closer to bedtime, so they're not sitting in your stomach and then coming up your esophagus when you lay down at night. It's also a good idea to eat small frequent meals instead of bigger, heavier meals and avoid late-night dinners and bedtime snacks.    Foods That Help Prevent Acid Reflux  Good news: There are plenty of things you can eat to help prevent acid  reflux. Stock your kitchen with foods from these three categories:    High-fiber foods  Fibrous foods make you feel full so you're less likely to overeat, which may contribute to heartburn. So, load up on healthy fiber from these foods:  Whole grains such as oatmeal, couscous and brown rice.  Root vegetables such as sweet potatoes, carrots and beets.  Green vegetables such as asparagus, broccoli and green beans.    Alkaline foods  Foods fall somewhere along the pH scale (an indicator of acid levels). Those that have a low pH are acidic and more likely to cause reflux. Those with higher pH are alkaline and can help offset strong stomach acid. Alkaline foods include:  Bananas  Melons  Cauliflower  Fennel  Nuts    Watery foods  Eating foods that contain a lot of water can dilute and weaken stomach acid. Choose foods such as:  Celery  Cucumber  Lettuce  Watermelon  Broth-based soups  Herbal tea

## 2024-08-20 NOTE — PROGRESS NOTES
"   Gastroenterology Clinic Consultation Note    Reason for Visit:  The primary encounter diagnosis was Esophageal dysphagia. Diagnoses of Chronic idiopathic constipation, Gastroesophageal reflux disease without esophagitis, Abdominal distension, History of colon polyps, and History of Helicobacter pylori infection were also pertinent to this visit.    PCP:   Mary Hart.   [address] / [place] [id number]      Initial HPI   This is a 59 y.o. female presenting for dysphagia, constipation, GERD, abdominal distention  Patient here today to establish care with aforementioned complaints.  Interview conducted using translation software.  Patient reports new onset "throat pain" which has been going on for the past 5 months. It occurs after eating or drinking anything and frequently she reports experiencing the sensation of something "stuck" even water first thing in the morning.  Symptoms may be worse after eating spicy or acidic foods.  She does recall having heartburn in the past however that would typically respond to omeprazole and symptomatically was different than current complaints. She reports associated epigastric pain and abdominal distention. Reports that she has epigastric pain and bloating starting in the morning after drinking liquids for the past 5 months. Reports sometimes trouble swallowing. Prior EGD unremarkable in the remote past.      In 2019 patient underwent screening colonoscopy which by her account was significant for colon polyps. She was instructed to repeat exam in 5 years. Prior to that she was diagnosed with H pylori induced ulcer [place]  She was treated with antibiotics. She reports chronic constipation and without intervention of OTC medications will go up to 10 days in between Bms. She was recently prescribed Trulance but due to cost never got it from the pharmacy. She does note lactose intolerance and beans make her abdominal distention and bloating worse. " "Negative for celiac diease, recent lab work unremarkable. Due for colonoscopy. Denies unintentional weight loss, fever, chills, nausea, vomiting, diarrhea, regurgitation, hematemesis, changes in bowel habits, changes in stool caliber, blood in stool, and melena.         ROS:  Review of Systems   Constitutional:  Negative for chills, fever, malaise/fatigue and weight loss.   Respiratory:  Negative for shortness of breath.    Cardiovascular:  Negative for chest pain.   Gastrointestinal:  Positive for abdominal pain, constipation and heartburn. Negative for blood in stool, diarrhea, melena, nausea and vomiting.   Neurological:  Negative for dizziness and weakness.        Medical History:  has a past medical history of Postmenopausal bleeding (2021) and Thyroid disease.    Surgical History:  has a past surgical history that includes  section, low transverse; Cholecystectomy; and Axillary Surgery.    Family History: family history includes Breast cancer in her mother..       Review of patient's allergies indicates:   Allergen Reactions    Vitamin a Rash       Current Outpatient Medications on File Prior to Visit   Medication Sig Dispense Refill    ergocalciferol (ERGOCALCIFEROL) 50,000 unit Cap Take 50,000 Units by mouth every 7 days.      levothyroxine (SYNTHROID) 50 MCG tablet Take 1 tablet (50 mcg total) by mouth before breakfast. 90 tablet 0    mupirocin (BACTROBAN) 2 % ointment Apply topically 3 (three) times daily. 15 g 1    [DISCONTINUED] omeprazole (PRILOSEC) 40 MG capsule Take 1 capsule (40 mg total) by mouth once daily. 90 capsule 1    [DISCONTINUED] plecanatide (TRULANCE) 3 mg Tab Take 3 mg by mouth once daily. For chronic constipation 90 tablet 3     No current facility-administered medications on file prior to visit.         Objective Findings:    Vital Signs:  /74   Pulse 85   Ht 5' 2" (1.575 m)   Wt 78.8 kg (173 lb 11.6 oz)   BMI 31.77 kg/m²   Body mass index is 31.77 " kg/m².    Physical Exam:  Physical Exam  Vitals and nursing note reviewed.   Constitutional:       General: She is not in acute distress.     Appearance: Normal appearance. She is not ill-appearing.   HENT:      Head: Normocephalic and atraumatic.      Right Ear: External ear normal.      Left Ear: External ear normal.      Nose: Nose normal.   Eyes:      General: No scleral icterus.     Extraocular Movements: Extraocular movements intact.   Cardiovascular:      Rate and Rhythm: Normal rate.   Pulmonary:      Effort: Pulmonary effort is normal. No respiratory distress.   Abdominal:      General: Bowel sounds are normal. There is no distension.      Palpations: Abdomen is soft.      Tenderness: There is no guarding.   Musculoskeletal:         General: Normal range of motion.      Cervical back: Normal range of motion.   Skin:     General: Skin is warm.   Neurological:      Mental Status: She is alert and oriented to person, place, and time.   Psychiatric:         Mood and Affect: Mood normal.         Behavior: Behavior is cooperative.         Thought Content: Thought content normal.           Labs:  Lab Results   Component Value Date    WBC 7.47 07/23/2024    HGB 13.3 07/23/2024    HCT 42.2 07/23/2024     07/23/2024    CHOL 180 07/23/2024    TRIG 230 (H) 07/23/2024    HDL 41 07/23/2024    ALKPHOS 86 07/23/2024    ALT 19 07/23/2024    AST 21 07/23/2024     07/23/2024    K 4.1 07/23/2024     07/23/2024    CREATININE 0.7 07/23/2024    BUN 6 07/23/2024    CO2 28 07/23/2024    TSH 1.840 07/23/2024    HGBA1C 5.6 07/23/2024       Imaging reviewed: NA       Endoscopy reviewed: colonoscopy 2019      Assessment:  1. Esophageal dysphagia    2. Chronic idiopathic constipation    3. Gastroesophageal reflux disease without esophagitis    4. Abdominal distension    5. History of colon polyps    6. History of Helicobacter pylori infection      Orders Placed This Encounter    H. pylori antigen, stool    linaCLOtide  "(LINZESS) 145 mcg Cap capsule    famotidine (PEPCID) 40 MG tablet       Plan:  EGD with manometry for dysphagia, GERD  2.   Repeat colonoscopy history of polyps  3.   Stool testing for H Pylori - start Pepcid after stool collected   4.   Linzess for constipation    It is common for Linzess to cause diarrhea the first 7-10 days after starting it.  Linzess is always more effective the first 7-10 days of use, and it then "wears off" afterward, meaning that the diarrhea improves.  The goal is 1 BM every day, or every other day, without "accidents" or urgency.  It is ok if the BM are still loose or watery as long as it is not more than once per day, with accidents, or urgent       Thank you for allowing me to participate in this patient's care.    Sincerely,     MARCO DELA CRUZ, FNP-C  Gastroenterology Department  [facility] Office [phone]    "

## 2024-08-20 NOTE — TELEPHONE ENCOUNTER
"----- Message from TONJA Pozo sent at 8/20/2024  2:43 PM CDT -----  Regarding: EGD/Colonscopy  Procedure: EGD/Colonoscopy    Diagnosis: Surveillance colonoscopy - Hx of colon polyps, Constipation, Abdominal pain, GERD, and Dysphagia    Procedure Timing: Within 4 weeks (Urgent)    #If within 4 weeks selected, please zhen as high priority#    #If greater than 12 weeks, please select "5-12 weeks" and delay sending until 3 months prior to requested date#     Location: Any Site    Additional Scheduling Information: No scheduling concerns    Prep Specifications:Standard prep    Is the patient taking a GLP-1 Agonist:no    Have you attached a patient to this message: yes  "

## 2024-08-21 ENCOUNTER — ANESTHESIA EVENT (OUTPATIENT)
Dept: ENDOSCOPY | Facility: HOSPITAL | Age: 59
End: 2024-08-21
Payer: COMMERCIAL

## 2024-08-21 NOTE — ANESTHESIA PREPROCEDURE EVALUATION
08/21/2024  Megan Hurtado is a 59 y.o., female.    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      There is no height or weight on file to calculate BMI.    Tobacco Use: Low Risk  (8/20/2024)    Patient History     Smoking Tobacco Use: Never     Smokeless Tobacco Use: Never     Passive Exposure: Not on file       Social History     Substance and Sexual Activity   Drug Use Never        Alcohol Use: Not At Risk (4/4/2023)    AUDIT-C     Frequency of Alcohol Consumption: Never     Average Number of Drinks: Patient does not drink     Frequency of Binge Drinking: Never       Review of patient's allergies indicates:   Allergen Reactions    Vitamin a Rash         Airway:  No value filed.    Pre-op Assessment    I have reviewed the Patient Summary Reports.     I have reviewed the Nursing Notes. I have reviewed the NPO Status.   I have reviewed the Medications.     Review of Systems  Anesthesia Hx:             Denies Family Hx of Anesthesia complications.    Denies Personal Hx of Anesthesia complications.                    Hematology/Oncology:  Hematology Normal   Oncology Normal                                    EENT/Dental:  EENT/Dental Normal           Cardiovascular:                hyperlipidemia                             Pulmonary:  Pulmonary Normal                       Renal/:  Renal/ Normal                 Hepatic/GI:     GERD             Musculoskeletal:  Musculoskeletal Normal                Neurological:    Neuromuscular Disease,                                   Endocrine:   Hypothyroidism        Obesity / BMI > 30  Dermatological:  Skin Normal    Psych:  Psychiatric Normal                    Physical Exam  General: Well nourished, Cooperative, Alert and Oriented    Airway:  Mallampati: II   Mouth Opening: Normal  TM Distance: Normal  Tongue: Normal  Neck ROM: Normal ROM    Dental:  Intact    Chest/Lungs:  Clear to auscultation, Normal Respiratory Rate    Heart:  Rate: Normal  Rhythm: Regular Rhythm  Sounds: Normal    Abdomen:  Normal        Anesthesia Plan  Type of Anesthesia, risks & benefits discussed:    Anesthesia Type: Gen Natural Airway  Intra-op Monitoring Plan: Standard ASA Monitors  Post Op Pain Control Plan: multimodal analgesia  Induction:  IV  Informed Consent: Informed consent signed with the Patient and all parties understand the risks and agree with anesthesia plan.  All questions answered.   ASA Score: 2  Day of Surgery Review of History & Physical: H&P Update referred to the surgeon/provider.    Ready For Surgery From Anesthesia Perspective.     .

## 2024-08-22 ENCOUNTER — LAB VISIT (OUTPATIENT)
Dept: LAB | Facility: HOSPITAL | Age: 59
End: 2024-08-22
Payer: COMMERCIAL

## 2024-08-22 DIAGNOSIS — Z86.19 HISTORY OF HELICOBACTER PYLORI INFECTION: ICD-10-CM

## 2024-08-22 DIAGNOSIS — R14.0 ABDOMINAL DISTENSION: ICD-10-CM

## 2024-08-22 DIAGNOSIS — K21.9 GASTROESOPHAGEAL REFLUX DISEASE WITHOUT ESOPHAGITIS: ICD-10-CM

## 2024-08-22 PROCEDURE — 87338 HPYLORI STOOL AG IA: CPT

## 2024-08-23 ENCOUNTER — TELEPHONE (OUTPATIENT)
Dept: ENDOSCOPY | Facility: HOSPITAL | Age: 59
End: 2024-08-23
Payer: COMMERCIAL

## 2024-08-23 NOTE — TELEPHONE ENCOUNTER
Telephoned pt for precall for colonoscopy scheduled on 8/26/24.  Spoke with pt.  She has no ride on 8/26/24 and asks to reschedule.    Physician to perform procedure(s) Dr. LAURENT Willams  Date of Procedure (s) 8/29/24  Arrival Time 1:30 PM  Time of Procedure(s) 2:30 PM   Location of Procedure(s) [place] 2nd Floor  Type of Rx Prep sent to patient: PEG  Instructions provided to patient via [facility]    Patient was informed on the following information and verbalized understanding. Screening questionnaire reviewed with patient and complete. If procedure requires anesthesia, a responsible adult needs to be present to accompany the patient home, patient cannot drive after receiving anesthesia. Appointment details are tentative, especially check-in time. Patient will receive a prep-op call 7 days prior to confirm check-in time for procedure. If applicable the patient should contact their pharmacy to verify Rx for procedure prep is ready for pick-up. Patient was advised to call the scheduling department at [phone] if pharmacy states no Rx is available. Patient was advised to call the endoscopy scheduling department if any questions or concerns arise.      [facility] Scheduling Department

## 2024-08-26 ENCOUNTER — ANESTHESIA (OUTPATIENT)
Dept: ENDOSCOPY | Facility: HOSPITAL | Age: 59
End: 2024-08-26
Payer: COMMERCIAL

## 2024-08-27 ENCOUNTER — NURSE TRIAGE (OUTPATIENT)
Dept: ADMINISTRATIVE | Facility: CLINIC | Age: 59
End: 2024-08-27
Payer: COMMERCIAL

## 2024-08-27 NOTE — TELEPHONE ENCOUNTER
Pt is sched for colonscopy on Thursday. States she has no instructions for prep, etc. Pt is Hebrew speaking. Language line utilized. Advised pt detailed instructions have been sent to her MyChart. Pt pulled up her MyChart during call and confirmed she does have the instructions and states that's all she needs and has no further questions.        ID [id number]  Reason for Disposition   Question about upcoming scheduled surgery, procedure or test, no triage required, and triager able to answer question    Protocols used: Information Only Call - No Triage-A-

## 2024-08-29 ENCOUNTER — HOSPITAL ENCOUNTER (OUTPATIENT)
Facility: HOSPITAL | Age: 59
Discharge: HOME | End: 2024-08-29
Attending: STUDENT IN AN ORGANIZED HEALTH CARE EDUCATION/TRAINING PROGRAM | Admitting: STUDENT IN AN ORGANIZED HEALTH CARE EDUCATION/TRAINING PROGRAM
Payer: COMMERCIAL

## 2024-08-29 ENCOUNTER — PATIENT MESSAGE (OUTPATIENT)
Dept: GASTROENTEROLOGY | Facility: CLINIC | Age: 59
End: 2024-08-29
Payer: COMMERCIAL

## 2024-08-29 VITALS
WEIGHT: 173.75 LBS | HEART RATE: 64 BPM | BODY MASS INDEX: 31.97 KG/M2 | DIASTOLIC BLOOD PRESSURE: 86 MMHG | SYSTOLIC BLOOD PRESSURE: 140 MMHG | OXYGEN SATURATION: 99 % | HEIGHT: 62 IN | RESPIRATION RATE: 20 BRPM | TEMPERATURE: 97 F

## 2024-08-29 DIAGNOSIS — K21.9 GERD WITHOUT ESOPHAGITIS: Primary | ICD-10-CM

## 2024-08-29 DIAGNOSIS — K59.00 CONSTIPATION: ICD-10-CM

## 2024-08-29 PROCEDURE — 94761 N-INVAS EAR/PLS OXIMETRY MLT: CPT

## 2024-08-29 PROCEDURE — 99900035 HC TECH TIME PER 15 MIN (STAT)

## 2024-08-29 PROCEDURE — 43239 EGD BIOPSY SINGLE/MULTIPLE: CPT | Mod: 51,,, | Performed by: STUDENT IN AN ORGANIZED HEALTH CARE EDUCATION/TRAINING PROGRAM

## 2024-08-29 PROCEDURE — 88305 TISSUE EXAM BY PATHOLOGIST: CPT | Mod: 59 | Performed by: PATHOLOGY

## 2024-08-29 PROCEDURE — 37000009 HC ANESTHESIA EA ADD 15 MINS: Performed by: STUDENT IN AN ORGANIZED HEALTH CARE EDUCATION/TRAINING PROGRAM

## 2024-08-29 PROCEDURE — 27201012 HC FORCEPS, HOT/COLD, DISP: Performed by: STUDENT IN AN ORGANIZED HEALTH CARE EDUCATION/TRAINING PROGRAM

## 2024-08-29 PROCEDURE — 37000008 HC ANESTHESIA 1ST 15 MINUTES: Performed by: STUDENT IN AN ORGANIZED HEALTH CARE EDUCATION/TRAINING PROGRAM

## 2024-08-29 PROCEDURE — 45385 COLONOSCOPY W/LESION REMOVAL: CPT | Performed by: STUDENT IN AN ORGANIZED HEALTH CARE EDUCATION/TRAINING PROGRAM

## 2024-08-29 PROCEDURE — 63600175 PHARM REV CODE 636 W HCPCS: Performed by: NURSE ANESTHETIST, CERTIFIED REGISTERED

## 2024-08-29 PROCEDURE — 45385 COLONOSCOPY W/LESION REMOVAL: CPT | Mod: ,,, | Performed by: STUDENT IN AN ORGANIZED HEALTH CARE EDUCATION/TRAINING PROGRAM

## 2024-08-29 PROCEDURE — 43239 EGD BIOPSY SINGLE/MULTIPLE: CPT | Performed by: STUDENT IN AN ORGANIZED HEALTH CARE EDUCATION/TRAINING PROGRAM

## 2024-08-29 PROCEDURE — 88342 IMHCHEM/IMCYTCHM 1ST ANTB: CPT | Performed by: PATHOLOGY

## 2024-08-29 PROCEDURE — 25000003 PHARM REV CODE 250: Performed by: NURSE ANESTHETIST, CERTIFIED REGISTERED

## 2024-08-29 RX ORDER — PROPOFOL 10 MG/ML
VIAL (ML) INTRAVENOUS CONTINUOUS PRN
Status: DISCONTINUED | OUTPATIENT
Start: 2024-08-29 | End: 2024-08-29

## 2024-08-29 RX ORDER — SODIUM CHLORIDE 9 MG/ML
INJECTION, SOLUTION INTRAVENOUS CONTINUOUS
Status: DISCONTINUED | OUTPATIENT
Start: 2024-08-29 | End: 2024-08-29 | Stop reason: HOSPADM

## 2024-08-29 RX ORDER — LIDOCAINE HYDROCHLORIDE 20 MG/ML
INJECTION INTRAVENOUS
Status: DISCONTINUED | OUTPATIENT
Start: 2024-08-29 | End: 2024-08-29

## 2024-08-29 RX ADMIN — SODIUM CHLORIDE: 0.9 INJECTION, SOLUTION INTRAVENOUS at 02:08

## 2024-08-29 RX ADMIN — PROPOFOL 150 MCG/KG/MIN: 10 INJECTION, EMULSION INTRAVENOUS at 02:08

## 2024-08-29 RX ADMIN — PROPOFOL 80 MG: 10 INJECTION, EMULSION INTRAVENOUS at 02:08

## 2024-08-29 RX ADMIN — GLYCOPYRROLATE 0.1 MG: 0.2 INJECTION, SOLUTION INTRAMUSCULAR; INTRAVENOUS at 02:08

## 2024-08-29 RX ADMIN — LIDOCAINE HYDROCHLORIDE 100 MG: 20 INJECTION INTRAVENOUS at 02:08

## 2024-08-29 NOTE — PROVATION PATIENT INSTRUCTIONS
Discharge Summary/Instructions after an Endoscopic Procedure  Patient Name: Megan Hurtado  Patient MRN: [id number]  Patient YOB: 1965  Thursday, August 29, 2024  Parveen Willams MD  Dear patient,  As a result of recent federal legislation (The Federal Cures Act), you may   receive lab or pathology results from your procedure in your [facility]   account before your physician is able to contact you. Your physician or   their representative will relay the results to you with their   recommendations at their soonest availability.  Thank you,  RESTRICTIONS:  During your procedure today, you received medications for sedation.  These   medications may affect your judgment, balance and coordination.  Therefore,   for 24 hours, you have the following restrictions:   - DO NOT drive a car, operate machinery, make legal/financial decisions,   sign important papers or drink alcohol.    ACTIVITY:  Today: no heavy lifting, straining or running due to procedural   sedation/anesthesia.  The following day: return to full activity including work.  DIET:  Eat and drink normally unless instructed otherwise.     TREATMENT FOR COMMON SIDE EFFECTS:  - Mild abdominal pain, nausea, belching, bloating or excessive gas:  rest,   eat lightly and use a heating pad.  - Sore Throat: treat with throat lozenges and/or gargle with warm salt   water.  - Because air was used during the procedure, expelling large amounts of air   from your rectum or belching is normal.  - If a bowel prep was taken, you may not have a bowel movement for 1-3 days.    This is normal.  SYMPTOMS TO WATCH FOR AND REPORT TO YOUR PHYSICIAN:  1. Abdominal pain or bloating, other than gas cramps.  2. Chest pain.  3. Back pain.  4. Signs of infection such as: chills or fever occurring within 24 hours   after the procedure.  5. Rectal bleeding, which would show as bright red, maroon, or black stools.   (A tablespoon of blood from the rectum is not serious, especially  if   hemorrhoids are present.)  6. Vomiting.  7. Weakness or dizziness.  GO DIRECTLY TO THE NEAREST EMERGENCY ROOM IF YOU HAVE ANY OF THE FOLLOWING:      Difficulty breathing              Chills and/or fever over 101 F   Persistent vomiting and/or vomiting blood   Severe abdominal pain   Severe chest pain   Black, tarry stools   Bleeding- more than one tablespoon   Any other symptom or condition that you feel may need urgent attention  Your doctor recommends these additional instructions:  If any biopsies were taken, your doctors clinic will contact you in 1 to 2   weeks with any results.  - Discharge patient to home (ambulatory).   - Patient has a contact number available for emergencies.  The signs and   symptoms of potential delayed complications were discussed with the   patient.  Return to normal activities tomorrow.  Written discharge   instructions were provided to the patient.   - Resume previous diet.   - Continue present medications.   - Return to primary care physician as previously scheduled.   - Repeat colonoscopy in 5 years for surveillance.  For questions, problems or results please call your physician - Parveen Willams MD at Work:  [phone].  [facility], EMERGENCY ROOM PHONE NUMBER: [phone]  IF A COMPLICATION OR EMERGENCY SITUATION ARISES AND YOU ARE UNABLE TO REACH   YOUR PHYSICIAN - GO DIRECTLY TO THE EMERGENCY ROOM.  Parveen Willams MD  8/29/2024 2:53:20 PM  This report has been verified and signed electronically.  Dear patient,  As a result of recent federal legislation (The Federal Cures Act), you may   receive lab or pathology results from your procedure in your [facility]   account before your physician is able to contact you. Your physician or   their representative will relay the results to you with their   recommendations at their soonest availability.  Thank you,  PROVATION

## 2024-08-29 NOTE — ANESTHESIA POSTPROCEDURE EVALUATION
Anesthesia Post Evaluation    Patient: Megan Hurtado    Procedure(s) Performed: Procedure(s) (LRB):  EGD (ESOPHAGOGASTRODUODENOSCOPY) (N/A)  COLONOSCOPY (N/A)    Final Anesthesia Type: general      Patient location during evaluation: PACU  Patient participation: Yes- Able to Participate  Level of consciousness: awake and alert  Post-procedure vital signs: reviewed and stable  Pain management: adequate  Airway patency: patent  DEANN mitigation strategies: Multimodal analgesia  PONV status at discharge: No PONV  Anesthetic complications: no      Cardiovascular status: hemodynamically stable  Respiratory status: spontaneous ventilation and room air  Hydration status: euvolemic  Follow-up not needed.              Vitals Value Taken Time   /75 08/29/24 1502   Temp  08/29/24 1511   Pulse 73 08/29/24 1510   Resp 19 08/29/24 1510   SpO2 100 % 08/29/24 1510   Vitals shown include unfiled device data.      No case tracking events are documented in the log.      Pain/Lashonda Score: No data recorded

## 2024-08-29 NOTE — PROVATION PATIENT INSTRUCTIONS
Discharge Summary/Instructions after an Endoscopic Procedure  Patient Name: Megan Hurtado  Patient MRN: [id number]  Patient YOB: 1965  Thursday, August 29, 2024  Parveen Willams MD  Dear patient,  As a result of recent federal legislation (The Federal Cures Act), you may   receive lab or pathology results from your procedure in your [facility]   account before your physician is able to contact you. Your physician or   their representative will relay the results to you with their   recommendations at their soonest availability.  Thank you,  RESTRICTIONS:  During your procedure today, you received medications for sedation.  These   medications may affect your judgment, balance and coordination.  Therefore,   for 24 hours, you have the following restrictions:   - DO NOT drive a car, operate machinery, make legal/financial decisions,   sign important papers or drink alcohol.    ACTIVITY:  Today: no heavy lifting, straining or running due to procedural   sedation/anesthesia.  The following day: return to full activity including work.  DIET:  Eat and drink normally unless instructed otherwise.     TREATMENT FOR COMMON SIDE EFFECTS:  - Mild abdominal pain, nausea, belching, bloating or excessive gas:  rest,   eat lightly and use a heating pad.  - Sore Throat: treat with throat lozenges and/or gargle with warm salt   water.  - Because air was used during the procedure, expelling large amounts of air   from your rectum or belching is normal.  - If a bowel prep was taken, you may not have a bowel movement for 1-3 days.    This is normal.  SYMPTOMS TO WATCH FOR AND REPORT TO YOUR PHYSICIAN:  1. Abdominal pain or bloating, other than gas cramps.  2. Chest pain.  3. Back pain.  4. Signs of infection such as: chills or fever occurring within 24 hours   after the procedure.  5. Rectal bleeding, which would show as bright red, maroon, or black stools.   (A tablespoon of blood from the rectum is not serious, especially  if   hemorrhoids are present.)  6. Vomiting.  7. Weakness or dizziness.  GO DIRECTLY TO THE NEAREST EMERGENCY ROOM IF YOU HAVE ANY OF THE FOLLOWING:      Difficulty breathing              Chills and/or fever over 101 F   Persistent vomiting and/or vomiting blood   Severe abdominal pain   Severe chest pain   Black, tarry stools   Bleeding- more than one tablespoon   Any other symptom or condition that you feel may need urgent attention  Your doctor recommends these additional instructions:  If any biopsies were taken, your doctors clinic will contact you in 1 to 2   weeks with any results.  - Discharge patient to home.   - Await pathology results.   - The findings and recommendations were discussed with the patient.   - Patient has a contact number available for emergencies.  The signs and   symptoms of potential delayed complications were discussed with the   patient.  Return to normal activities tomorrow.  Written discharge   instructions were provided to the patient.   - The findings and recommendations were discussed with the patient.  For questions, problems or results please call your physician - Parveen Willams MD at Work:  [phone].  [facility], EMERGENCY ROOM PHONE NUMBER: [phone]  IF A COMPLICATION OR EMERGENCY SITUATION ARISES AND YOU ARE UNABLE TO REACH   YOUR PHYSICIAN - GO DIRECTLY TO THE EMERGENCY ROOM.  Parveen Willams MD  8/29/2024 2:52:01 PM  This report has been verified and signed electronically.  Dear patient,  As a result of recent federal legislation (The Federal Cures Act), you may   receive lab or pathology results from your procedure in your [facility]   account before your physician is able to contact you. Your physician or   their representative will relay the results to you with their   recommendations at their soonest availability.  Thank you,  PROVATION

## 2024-08-29 NOTE — H&P
Short Stay Endoscopy History and Physical    PCP - Mary Hart MD  Referring Physician - Jose Lillian H, FNP-C  [address] [facility]  [place] [address]    Procedure - EGD and Colonoscopy  ASA - per anesthesia  Mallampati - per anesthesia  History of Anesthesia problems - no  Family history Anesthesia problems -  no   Plan of anesthesia - General    HPI  59 y.o. female    Reason for procedure:   History of colon polyps [Z86.010]  Constipation, unspecified constipation type [K59.00]  Abdominal pain, unspecified abdominal location [R10.9]  Gastroesophageal reflux disease, unspecified whether esophagitis present [K21.9]  Dysphagia, unspecified type [R13.10]      ROS:  Constitutional: No fevers, chills, No weight loss  CV: No chest pain  Pulm: No cough, No shortness of breath  GI: see HPI    Medical History:  has a past medical history of Postmenopausal bleeding (2021) and Thyroid disease.    Surgical History:  has a past surgical history that includes  section, low transverse; Cholecystectomy; and Axillary Surgery.    Family History: family history includes Breast cancer in her mother.    Social History:  reports that she has never smoked. She has never used smokeless tobacco. She reports that she does not drink alcohol and does not use drugs.    Review of patient's allergies indicates:   Allergen Reactions    Vitamin a Rash       Medications:   Medications Prior to Admission   Medication Sig Dispense Refill Last Dose    levothyroxine (SYNTHROID) 50 MCG tablet Take 1 tablet (50 mcg total) by mouth before breakfast. 90 tablet 0 2024    ergocalciferol (ERGOCALCIFEROL) 50,000 unit Cap Take 50,000 Units by mouth every 7 days.   2024    famotidine (PEPCID) 40 MG tablet Take 1 tablet (40 mg total) by mouth once daily. 30 tablet 11 2024    linaCLOtide (LINZESS) 145 mcg Cap capsule Take 1 capsule (145 mcg total) by mouth before breakfast. 30 capsule 11 Unknown    mupirocin (BACTROBAN) 2 %  ointment Apply topically 3 (three) times daily. 15 g 1 Unknown       Physical Exam:    Vital Signs:   Vitals:    08/29/24 1351   BP: 126/77   Pulse: 65   Resp: 18   Temp: 98.4 °F (36.9 °C)       General Appearance: Well appearing in no acute distress  Abdomen: Soft, non tender, non distended with normal bowel sounds, no masses    Labs:  Lab Results   Component Value Date    WBC 7.47 07/23/2024    HGB 13.3 07/23/2024    HCT 42.2 07/23/2024     07/23/2024    CHOL 180 07/23/2024    TRIG 230 (H) 07/23/2024    HDL 41 07/23/2024    ALT 19 07/23/2024    AST 21 07/23/2024     07/23/2024    K 4.1 07/23/2024     07/23/2024    CREATININE 0.7 07/23/2024    BUN 6 07/23/2024    CO2 28 07/23/2024    TSH 1.840 07/23/2024    HGBA1C 5.6 07/23/2024       I have explained the risks and benefits of this endoscopic procedure to the patient including but not limited to bleeding, inflammation, infection, perforation, and death.      Parveen Willams MD

## 2024-08-29 NOTE — TRANSFER OF CARE
"Anesthesia Transfer of Care Note    Patient: Megan Hurtado    Procedure(s) Performed: Procedure(s) (LRB):  EGD (ESOPHAGOGASTRODUODENOSCOPY) (N/A)  COLONOSCOPY (N/A)    Patient location: PACU    Anesthesia Type: general    Transport from OR: Transported from OR on room air with adequate spontaneous ventilation    Post pain: adequate analgesia    Post assessment: no apparent anesthetic complications and tolerated procedure well    Post vital signs: stable    Level of consciousness: awake    Nausea/Vomiting: no nausea/vomiting    Complications: none    Transfer of care protocol was followed      Last vitals: Visit Vitals  /77 (BP Location: Left arm, Patient Position: Lying)   Pulse 65   Temp 36.9 °C (98.4 °F) (Temporal)   Resp 18   Ht 5' 2" (1.575 m)   Wt 78.8 kg (173 lb 11.6 oz)   SpO2 98%   Breastfeeding No   BMI 31.77 kg/m²     "

## 2024-08-31 LAB — H PYLORI AG STL QL IA: NOT DETECTED

## 2024-09-04 LAB
FINAL PATHOLOGIC DIAGNOSIS: NORMAL
GROSS: NORMAL
Lab: NORMAL
MICROSCOPIC EXAM: NORMAL

## 2024-09-24 ENCOUNTER — HOSPITAL ENCOUNTER (EMERGENCY)
Facility: HOSPITAL | Age: 59
Discharge: HOME | End: 2024-09-24
Attending: EMERGENCY MEDICINE
Payer: COMMERCIAL

## 2024-09-24 VITALS
OXYGEN SATURATION: 95 % | BODY MASS INDEX: 30.36 KG/M2 | RESPIRATION RATE: 16 BRPM | WEIGHT: 165 LBS | DIASTOLIC BLOOD PRESSURE: 79 MMHG | TEMPERATURE: 98 F | HEIGHT: 62 IN | HEART RATE: 75 BPM | SYSTOLIC BLOOD PRESSURE: 134 MMHG

## 2024-09-24 DIAGNOSIS — T30.0 SUPERFICIAL BURN: ICD-10-CM

## 2024-09-24 DIAGNOSIS — Z77.098 CHEMICAL EXPOSURE: Primary | ICD-10-CM

## 2024-09-24 PROCEDURE — 25000003 PHARM REV CODE 250

## 2024-09-24 PROCEDURE — 99284 EMERGENCY DEPT VISIT MOD MDM: CPT

## 2024-09-24 RX ORDER — MUPIROCIN 20 MG/G
OINTMENT TOPICAL 3 TIMES DAILY
Qty: 15 G | Refills: 0 | Status: SHIPPED | OUTPATIENT
Start: 2024-09-24

## 2024-09-24 RX ORDER — DIPHENHYDRAMINE HCL 25 MG
25 CAPSULE ORAL EVERY 6 HOURS PRN
Qty: 20 CAPSULE | Refills: 0 | Status: SHIPPED | OUTPATIENT
Start: 2024-09-24

## 2024-09-24 RX ORDER — CETIRIZINE HYDROCHLORIDE 10 MG/1
10 TABLET ORAL DAILY
Qty: 30 TABLET | Refills: 0 | Status: SHIPPED | OUTPATIENT
Start: 2024-09-24 | End: 2025-09-24

## 2024-09-24 RX ORDER — SILVER SULFADIAZINE 10 G/1000G
CREAM TOPICAL 2 TIMES DAILY
Qty: 50 G | Refills: 0 | Status: SHIPPED | OUTPATIENT
Start: 2024-09-24

## 2024-09-24 RX ORDER — CETIRIZINE HYDROCHLORIDE 10 MG/1
10 TABLET ORAL
Status: COMPLETED | OUTPATIENT
Start: 2024-09-24 | End: 2024-09-24

## 2024-09-24 RX ADMIN — CETIRIZINE HYDROCHLORIDE 10 MG: 10 TABLET, FILM COATED ORAL at 04:09
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
## 2024-09-24 NOTE — DISCHARGE INSTRUCTIONS
Ms. Hurtado,     - Toya Granados   - Benadryl (noche) & Zyrtec (juve)       Thank you for letting me care for you today! It was nice meeting you, and I hope you feel better soon.   If you would like access to your chart and what was done today please utilize the [facility].   Please don't hesitate to return if your symptoms worsen or you develop any other worrisome symptoms.    Our goal in the emergency department is to always give you outstanding care and exceptional service. You may receive a survey by mail or e-mail in the next week regarding your experience in our ED. We would greatly appreciate you completing and returning the survey. Your feedback provides us with a way to recognize our staff who give very good care and it helps us learn how to improve when your experience was below our aspiration of excellence.     Sincerely,    Anika Juarez PA-C  Emergency Department Physician Assistant  [facility], [place], and [place]

## 2024-09-24 NOTE — Clinical Note
"Megan Rodriguezmary jane Hurtado was seen and treated in our emergency department on 9/24/2024.  She may return to work on 09/30/2024.       If you have any questions or concerns, please don't hesitate to call.      Anika Juarez PA-C"

## 2024-09-27 ENCOUNTER — TELEPHONE (OUTPATIENT)
Dept: FAMILY MEDICINE | Facility: CLINIC | Age: 59
End: 2024-09-27
Payer: COMMERCIAL

## 2024-09-27 NOTE — TELEPHONE ENCOUNTER
----- Message from Dinorah Sherwood sent at 9/27/2024  1:38 PM CDT -----  Regarding: SELF  Type:  Patient Returning Call     Who Called:SELF     Who Left Message for Patient:Tiara Darling MA     Does the patient know what this is regarding?:yes     Would the patient rather a call back or a response via [facility]?-call back     Best Call Back Number:[phone]     Additional Information:

## 2024-10-06 DIAGNOSIS — E03.9 ACQUIRED HYPOTHYROIDISM: ICD-10-CM

## 2024-10-06 NOTE — TELEPHONE ENCOUNTER
No care due was identified.  [facility] Embedded Care Due Messages. Reference number: [id number].   10/06/2024 10:33:55 AM CDT

## 2024-10-07 RX ORDER — LEVOTHYROXINE SODIUM 50 UG/1
50 TABLET ORAL
Qty: 90 TABLET | Refills: 3 | Status: SHIPPED | OUTPATIENT
Start: 2024-10-07

## 2024-10-07 NOTE — TELEPHONE ENCOUNTER
Refill Decision Note   Megan Hurtado  is requesting a refill authorization.  Brief Assessment and Rationale for Refill:  Approve     Medication Therapy Plan:  Reviewed acute care/admission visit notes; No follow up with PCP recommended by acute care provider; Approved per protocol ACCEPTABLE USE PER OR PROTOCOL ; RISK MINIMAL WHEN THYROID LABS NORMAL , TSH WNL    Medication Reconciliation Completed: No   Comments:     No Care Gaps recommended.     Note composed:10:10 AM 10/07/2024

## 2024-10-16 ENCOUNTER — OFFICE VISIT (OUTPATIENT)
Dept: GASTROENTEROLOGY | Facility: CLINIC | Age: 59
End: 2024-10-16
Payer: COMMERCIAL

## 2024-10-16 VITALS
DIASTOLIC BLOOD PRESSURE: 89 MMHG | BODY MASS INDEX: 30.39 KG/M2 | WEIGHT: 165.13 LBS | HEART RATE: 70 BPM | HEIGHT: 62 IN | SYSTOLIC BLOOD PRESSURE: 135 MMHG

## 2024-10-16 DIAGNOSIS — K64.4 EXTERNAL HEMORRHOIDS: ICD-10-CM

## 2024-10-16 DIAGNOSIS — Z71.2 ENCOUNTER TO DISCUSS COLONOSCOPY RESULTS: ICD-10-CM

## 2024-10-16 DIAGNOSIS — K21.9 GERD WITHOUT ESOPHAGITIS: Primary | ICD-10-CM

## 2024-10-16 PROCEDURE — 3075F SYST BP GE 130 - 139MM HG: CPT | Mod: CPTII,S$GLB,,

## 2024-10-16 PROCEDURE — 1159F MED LIST DOCD IN RCRD: CPT | Mod: CPTII,S$GLB,,

## 2024-10-16 PROCEDURE — 3044F HG A1C LEVEL LT 7.0%: CPT | Mod: CPTII,S$GLB,,

## 2024-10-16 PROCEDURE — 99999 PR PBB SHADOW E&M-EST. PATIENT-LVL V: CPT | Mod: PBBFAC,,,

## 2024-10-16 PROCEDURE — 1160F RVW MEDS BY RX/DR IN RCRD: CPT | Mod: CPTII,S$GLB,,

## 2024-10-16 PROCEDURE — 99214 OFFICE O/P EST MOD 30 MIN: CPT | Mod: S$GLB,,,

## 2024-10-16 PROCEDURE — 3079F DIAST BP 80-89 MM HG: CPT | Mod: CPTII,S$GLB,,

## 2024-10-16 PROCEDURE — 3008F BODY MASS INDEX DOCD: CPT | Mod: CPTII,S$GLB,,

## 2024-10-16 RX ORDER — HYDROCORTISONE 25 MG/G
CREAM TOPICAL 2 TIMES DAILY
Qty: 20 G | Refills: 2 | Status: SHIPPED | OUTPATIENT
Start: 2024-10-16

## 2024-10-16 RX ORDER — PANTOPRAZOLE SODIUM 40 MG/1
40 TABLET, DELAYED RELEASE ORAL DAILY
Qty: 30 TABLET | Refills: 11 | Status: SHIPPED | OUTPATIENT
Start: 2024-10-16 | End: 2025-10-16

## 2024-10-16 NOTE — PROGRESS NOTES
"GENERAL GI PATIENT INTAKE:    COVID symptoms in the last 7 days (runny nose, sore throat, congestion, cough, fever): No  PCP: Mayr Hart  If not PCP-  number given to establish [phone]: N/A    ALLERGIES REVIEWED:  Yes    CHIEF COMPLAINT:    Chief Complaint   Patient presents with    Follow-up     EGD/Colon results       VITAL SIGNS:  /89   Pulse 70   Ht 5' 2" (1.575 m)   Wt 74.9 kg (165 lb 2 oz)   BMI 30.20 kg/m²      Change in medical, surgical, family or social history: No      REVIEWED MEDICATION LIST RECONCILED INCLUDING ABOVE MEDS:  Yes      "

## 2024-10-16 NOTE — PROGRESS NOTES
Gastroenterology Clinic Consultation Note    Reason for Visit:  The primary encounter diagnosis was GERD without esophagitis. Diagnoses of External hemorrhoids and Encounter to discuss colonoscopy results were also pertinent to this visit.    PCP:   Mary Hart.         Initial HPI   This is a 59 y.o. female presenting for GERD, hemorrhoids, EGD/Colonoscopy results    Interview conducted using translation software. Patient in clinic for f/u of visit on . She has since then had her EGD and colonoscopy which showed mild chronic gastritis, external hemorrhoid, and 3 colon polyps. She reports she is still experiencing heartburn on Pepcid. She has not been taking a PPI. Recent lab work unremarkable. Negative celiac testing. Denies unintentional weight loss, fever, chills, nausea, vomiting, constipation, diarrhea, regurgitation, hematemesis, difficulties swallowing, changes in bowel habits, changes in stool caliber, blood in stool, and abdominal pain.      Additionally, she has complaints of hemorrhoid discomfort with BM. Denies bleeding. She is not currently anything for hemorrhoids. She would like the hemorrhoids removed.     Seen by me  for dysphagia, constipation, GERD, abdominal distention    ROS:  Review of Systems   Constitutional:  Negative for chills, fever, malaise/fatigue and weight loss.   Respiratory:  Negative for shortness of breath.    Cardiovascular:  Negative for chest pain.   Gastrointestinal:  Positive for heartburn. Negative for abdominal pain, blood in stool, constipation, diarrhea, melena, nausea and vomiting.        +hemorrhoids   Neurological:  Negative for dizziness and weakness.        Medical History:  has a past medical history of Postmenopausal bleeding (2021) and Thyroid disease.    Surgical History:  has a past surgical history that includes  section, low transverse; Cholecystectomy; Axillary Surgery; Esophagogastroduodenoscopy (N/A, 2024); and Colonoscopy (N/A,  "8/29/2024).    Family History: family history includes Breast cancer in her mother..       Review of patient's allergies indicates:   Allergen Reactions    Vitamin a Rash       Current Outpatient Medications on File Prior to Visit   Medication Sig Dispense Refill    levothyroxine (SYNTHROID) 50 MCG tablet Take 1 tablet (50 mcg total) by mouth before breakfast. 90 tablet 3    mupirocin (BACTROBAN) 2 % ointment Apply topically 3 (three) times daily. 15 g 0    silver sulfADIAZINE 1% (SILVADENE) 1 % cream Apply topically 2 (two) times daily. 50 g 0    cetirizine (ZYRTEC) 10 MG tablet Take 1 tablet (10 mg total) by mouth once daily. (Patient not taking: Reported on 10/16/2024) 30 tablet 0    diphenhydrAMINE (BENADRYL) 25 mg capsule Take 1 capsule (25 mg total) by mouth every 6 (six) hours as needed for Itching or Allergies. (Patient not taking: Reported on 10/16/2024) 20 capsule 0    ergocalciferol (ERGOCALCIFEROL) 50,000 unit Cap Take 50,000 Units by mouth every 7 days. (Patient not taking: Reported on 10/16/2024)      famotidine (PEPCID) 40 MG tablet Take 1 tablet (40 mg total) by mouth once daily. (Patient not taking: Reported on 10/16/2024) 30 tablet 11    linaCLOtide (LINZESS) 145 mcg Cap capsule Take 1 capsule (145 mcg total) by mouth before breakfast. (Patient not taking: Reported on 10/16/2024) 30 capsule 11     No current facility-administered medications on file prior to visit.     Objective Findings:    Vital Signs:  /89   Pulse 70   Ht 5' 2" (1.575 m)   Wt 74.9 kg (165 lb 2 oz)   BMI 30.20 kg/m²   Body mass index is 30.2 kg/m².    Physical Exam:  Physical Exam  Vitals and nursing note reviewed.   Constitutional:       General: She is not in acute distress.     Appearance: Normal appearance. She is obese. She is not ill-appearing.   HENT:      Head: Normocephalic and atraumatic.      Right Ear: External ear normal.      Left Ear: External ear normal.      Nose: Nose normal.   Eyes:      General: No " scleral icterus.     Extraocular Movements: Extraocular movements intact.   Cardiovascular:      Rate and Rhythm: Normal rate.   Pulmonary:      Effort: Pulmonary effort is normal. No respiratory distress.   Abdominal:      General: Bowel sounds are normal. There is no distension.      Palpations: Abdomen is soft.      Tenderness: There is no guarding.   Musculoskeletal:         General: Normal range of motion.      Cervical back: Normal range of motion.   Skin:     General: Skin is warm and dry.   Neurological:      Mental Status: She is alert and oriented to person, place, and time.   Psychiatric:         Mood and Affect: Mood normal.         Behavior: Behavior is cooperative.         Thought Content: Thought content normal.       Labs:  Lab Results   Component Value Date    WBC 7.47 07/23/2024    HGB 13.3 07/23/2024    HCT 42.2 07/23/2024     07/23/2024    CHOL 180 07/23/2024    TRIG 230 (H) 07/23/2024    HDL 41 07/23/2024    ALKPHOS 86 07/23/2024    ALT 19 07/23/2024    AST 21 07/23/2024     07/23/2024    K 4.1 07/23/2024     07/23/2024    CREATININE 0.7 07/23/2024    BUN 6 07/23/2024    CO2 28 07/23/2024    TSH 1.840 07/23/2024    HGBA1C 5.6 07/23/2024       Imaging reviewed: NA      Endoscopy reviewed:   Findings:       The terminal ileum appeared normal.        Three sessile polyps were found in the transverse colon and        ascending colon. The polyps were 4 to 5 mm in size. These polyps        were removed with a cold snare. Resection and retrieval were        complete.        Non-bleeding external hemorrhoids were found during retroflexion.        The hemorrhoids were medium-sized.        The exam was otherwise without abnormality on direct and        retroflexion views.   Impression:            - The examined portion of the ileum was normal.                          - Three 4 to 5 mm polyps in the transverse colon                          and in the ascending colon, removed with a  cold                          snare. Resected and retrieved.                          - Non-bleeding external hemorrhoids.                          - The examination was otherwise normal on direct                          and retroflexion views.   Recommendation:        - Discharge patient to home (ambulatory).                          - Patient has a contact number available for                          emergencies. The signs and symptoms of potential                          delayed complications were discussed with the                          patient. Return to normal activities tomorrow.                          Written discharge instructions were provided to                          the patient.                          - Resume previous diet.                          - Continue present medications.                          - Return to primary care physician as previously                          scheduled.                          - Repeat colonoscopy in 5 years for surveillance.   Attending Participation:        I personally performed the entire procedure.        I was present and participated during the entire procedure,        including non-key portions.   Parveen Willams MD   8/29/2024 2:53:20 PM     EGD 8/29/2024   Findings:       The esophagus was normal.        No endoscopic abnormality was evident in the esophagus to explain        the patient's complaint of dysphagia. Biopsies were taken with a        cold forceps for histology.        The entire examined stomach was normal. Biopsies were taken with a        cold forceps for histology.        The examined duodenum was normal.   Impression:            - Normal esophagus.                          - No endoscopic esophageal abnormality to explain                          patient's dysphagia. Biopsied.                          - Normal stomach. Biopsied.                          - Normal examined duodenum.   Recommendation:        - Discharge  patient to home.                          - Await pathology results.                          - The findings and recommendations were discussed                          with the patient.                          - Patient has a contact number available for                          emergencies. The signs and symptoms of potential                          delayed complications were discussed with the                          patient. Return to normal activities tomorrow.                          Written discharge instructions were provided to                          the patient.                          - The findings and recommendations were discussed                          with the patient.   Attending Participation:        I personally performed the entire procedure.   Parveen Willams MD   8/29/2024 2:52:01 PM     Pathology:   1. Stomach (biopsy):   Mild chronic antral and oxyntic gastritis, no activity   No Helicobacter organisms (routine and immunostain)     2. Esophagus (biopsy):   No intestinal metaplasia, no dysplasia   Squamous mucosa with no significant histopathologic changes   No support for eosinophilic esophagitis     3. Colon polyp x3 (polypectomy):   Tubular adenoma, 3 fragments   No significant histopathologic changes, multiple fragments       Assessment:  1. GERD without esophagitis    2. External hemorrhoids    3. Encounter to discuss colonoscopy results      Orders Placed This Encounter    Ambulatory referral/consult to Colorectal Surgery    pantoprazole (PROTONIX) 40 MG tablet    hydrocortisone 2.5 % cream       Plan:  Referral to CRS for hemorrhoids  2.   Anusol for hemorrhoid discomfort  3.   Start Protonix daily for GERD       For GERD/Reflux:  Take your PPI 30-45 minutes before your first protein containing meal (breakfast) every day. Take daily for 8 weeks. If symptoms improve ok discontinue an use PPI as needed for symptoms.   Take Pepcid 20 mg every evening before bedtime to help  with nocturnal symptoms, as needed.  Remain upright for at least 3 hours after eating.   Elevate the head of the bed for nighttime.   Avoid foods that you have noticed make your symptoms worse (possible triggers include: peppermint, alcohol, chocolate, caffeine, spicy foods, greasy/fried foods, acidic foods-citrus).   Lose weight if you are overweight -- of all of the lifestyle changes you can make, this one is the most effective.  Follow up in 8 weeks with provider to assess symptoms and ability to taper off PPI (can be a virtual visit).        Thank you for allowing me to participate in this patient's care.    Sincerely,     NOREEN UNDERWOOD-C  Gastroenterology Department  [facility] Office [phone]

## 2024-10-29 ENCOUNTER — PATIENT MESSAGE (OUTPATIENT)
Dept: GASTROENTEROLOGY | Facility: CLINIC | Age: 59
End: 2024-10-29
Payer: COMMERCIAL

## 2024-11-13 NOTE — PROGRESS NOTES
CRS Office Visit History and Physical    Referring Md:   Lillian Garcia, Fnp-c  [address] [facility] [place] [address]    SUBJECTIVE:     Chief Complaint: hemorrhoids    History of Present Illness:  The patient is a new patient to this practice.     HPI obtained with assistance of a      Course is as follows:  Megan Hurtado is a 59 y.o. female presents with hemorrhoids. Reports she has been struggling with hemorrhoids for several years.  Has constipation and has failed multiple laxatives.  Currently takes metamucil and dulcolax.  Has a daily bowel movement.  Her primary care physician prescribed her linzess but this was not covered by insurance.  Denies rectal bleeding.      Last Colonoscopy: 24 Willams, 3 adenomas     Review of patient's allergies indicates:   Allergen Reactions    Vitamin a Rash       Past Medical History:   Diagnosis Date    Postmenopausal bleeding 2021    Thyroid disease      Past Surgical History:   Procedure Laterality Date    AXILLARY SURGERY      mass excised, benign     SECTION, LOW TRANSVERSE      three    CHOLECYSTECTOMY      COLONOSCOPY N/A 2024    Procedure: COLONOSCOPY;  Surgeon: Parveen Willams MD;  Location: [facility] ENDOSCOPY;  Service: Endoscopy;  Laterality: N/A;    ESOPHAGOGASTRODUODENOSCOPY N/A 2024    Procedure: EGD (ESOPHAGOGASTRODUODENOSCOPY);  Surgeon: Parveen Willams MD;  Location: [facility] ENDOSCOPY;  Service: Endoscopy;  Laterality: N/A;  PORTAL/IN PERSON/PEG-DW   Pre call attempted. Left VM. Instr not read. SG  24-R/S to 24, updated instr portal-DS     Family History   Problem Relation Name Age of Onset    Breast cancer Mother       Social History     Tobacco Use    Smoking status: Never    Smokeless tobacco: Never   Substance Use Topics    Alcohol use: Never    Drug use: Never        Review of Systems:  Review of Systems   All other systems reviewed and are negative.      OBJECTIVE:     Vital Signs (Most  "Recent)  /81 (BP Location: Left arm, Patient Position: Sitting)   Pulse 67   Ht 5' 2" (1.575 m)   Wt 73.3 kg (161 lb 9.6 oz)   BMI 29.56 kg/m²     Physical Exam:  General: 59 y.o. female in no distress   Neuro: alert and oriented x 4.  Moves all extremities.     HEENT: normocephalic, atraumatic, PERRL, EOMI   Respiratory: respirations are even and unlabored  Cardiac: regular rate and rhythm  Abdomen: soft, NTND  Extremities: Warm dry and intact  Skin: no rashes  Anorectal: external hemorrhoids with resolving thrombus in the left lateral position, NIGHAT with increased tone, no masses or blood     Anoscopy: Verbal consent obtained.  A lubricated anoscope was inserted and circumferential inspection performed.  There were nonbleeding internal hemorrhoids with stigmata of prolapse and area of thrombus in the left lateral position.  The scope was withdrawn.     Labs: NA    Imaging: NA      ASSESSMENT/PLAN:     Diagnoses and all orders for this visit:    External hemorrhoids  -     Ambulatory referral/consult to Colorectal Surgery    GERD without esophagitis  -     Ambulatory referral/consult to Colorectal Surgery    Encounter to discuss colonoscopy results  -     Ambulatory referral/consult to Colorectal Surgery    Other orders  -     linaCLOtide (LINZESS) 145 mcg Cap capsule; Take 1 capsule (145 mcg total) by mouth before breakfast.  -     phenylephrine-cocoa butter 0.25-88.44 % Supp suppository; Place 1 suppository rectally 2 (two) times a day.        59 y.o. female with hemorrhoids     - Will change fiber to benefiber as patient reports GI upset with metamucil   - Patient has family multiple OTC laxatives.  Was not able to get linzess covered at [facility].  Will send prescription to [facility]   - Will try 10 day course of suppositories   - We reviewed surgical options for management of hemorrhoids given significant internal and external disease.  Follow up in 6-8 weeks.     Linda Hernandez MD  Staff " Surgeon  Colon & Rectal Surgery

## 2024-11-14 ENCOUNTER — TELEPHONE (OUTPATIENT)
Dept: SURGERY | Facility: CLINIC | Age: 59
End: 2024-11-14
Payer: COMMERCIAL

## 2024-11-18 ENCOUNTER — OFFICE VISIT (OUTPATIENT)
Dept: SURGERY | Facility: CLINIC | Age: 59
End: 2024-11-18
Payer: COMMERCIAL

## 2024-11-18 VITALS
WEIGHT: 161.63 LBS | DIASTOLIC BLOOD PRESSURE: 81 MMHG | BODY MASS INDEX: 29.74 KG/M2 | SYSTOLIC BLOOD PRESSURE: 129 MMHG | HEIGHT: 62 IN | HEART RATE: 67 BPM

## 2024-11-18 DIAGNOSIS — K64.4 EXTERNAL HEMORRHOIDS: ICD-10-CM

## 2024-11-18 DIAGNOSIS — K21.9 GERD WITHOUT ESOPHAGITIS: ICD-10-CM

## 2024-11-18 DIAGNOSIS — Z71.2 ENCOUNTER TO DISCUSS COLONOSCOPY RESULTS: ICD-10-CM

## 2024-11-18 PROCEDURE — 46600 DIAGNOSTIC ANOSCOPY SPX: CPT | Mod: S$GLB,,, | Performed by: SURGERY

## 2024-11-18 PROCEDURE — 3074F SYST BP LT 130 MM HG: CPT | Mod: CPTII,S$GLB,, | Performed by: SURGERY

## 2024-11-18 PROCEDURE — 99204 OFFICE O/P NEW MOD 45 MIN: CPT | Mod: 25,S$GLB,, | Performed by: SURGERY

## 2024-11-18 PROCEDURE — 1159F MED LIST DOCD IN RCRD: CPT | Mod: CPTII,S$GLB,, | Performed by: SURGERY

## 2024-11-18 PROCEDURE — 3079F DIAST BP 80-89 MM HG: CPT | Mod: CPTII,S$GLB,, | Performed by: SURGERY

## 2024-11-18 PROCEDURE — 99999 PR PBB SHADOW E&M-EST. PATIENT-LVL III: CPT | Mod: PBBFAC,,, | Performed by: SURGERY

## 2024-11-18 PROCEDURE — 3044F HG A1C LEVEL LT 7.0%: CPT | Mod: CPTII,S$GLB,, | Performed by: SURGERY

## 2024-11-18 PROCEDURE — 3008F BODY MASS INDEX DOCD: CPT | Mod: CPTII,S$GLB,, | Performed by: SURGERY

## 2024-11-19 RX ORDER — PLECANATIDE 3 MG/1
3 TABLET ORAL DAILY
Qty: 30 TABLET | Refills: 3 | Status: SHIPPED | OUTPATIENT
Start: 2024-11-19 | End: 2025-03-19

## 2024-12-01 DIAGNOSIS — E03.9 ACQUIRED HYPOTHYROIDISM: ICD-10-CM

## 2024-12-01 NOTE — TELEPHONE ENCOUNTER
No care due was identified.  [facility] Embedded Care Due Messages. Reference number: [id number].   12/01/2024 10:37:43 AM CST

## 2024-12-02 RX ORDER — LEVOTHYROXINE SODIUM 50 UG/1
50 TABLET ORAL
Qty: 90 TABLET | Refills: 2 | Status: SHIPPED | OUTPATIENT
Start: 2024-12-02

## 2024-12-02 NOTE — TELEPHONE ENCOUNTER
Refill Routing Note   Medication(s) are not appropriate for processing by [facility] for the following reason(s):        ED/Hospital Visit since last OV with provider    [facility] action(s):  Defer      Medication Therapy Plan: MULTIPLE ED VISITS SINCE LAST OV    Extended chart review required: Yes     Appointments  past 12m or future 3m with PCP    Date Provider   Last Visit   7/23/2024 Mary Hart MD   Next Visit   Visit date not found Mary Hart MD   ED visits in past 90 days: 1        Note composed:11:51 AM 12/02/2024

## 2025-01-31 ENCOUNTER — TELEPHONE (OUTPATIENT)
Facility: CLINIC | Age: 60
End: 2025-01-31
Payer: COMMERCIAL

## 2025-03-10 ENCOUNTER — PATIENT MESSAGE (OUTPATIENT)
Dept: FAMILY MEDICINE | Facility: CLINIC | Age: 60
End: 2025-03-10
Payer: COMMERCIAL

## 2025-03-14 ENCOUNTER — TELEPHONE (OUTPATIENT)
Dept: SURGERY | Facility: CLINIC | Age: 60
End: 2025-03-14
Payer: COMMERCIAL

## 2025-04-14 ENCOUNTER — HOSPITAL ENCOUNTER (OUTPATIENT)
Dept: RADIOLOGY | Facility: HOSPITAL | Age: 60
Discharge: HOME | End: 2025-04-14
Payer: COMMERCIAL

## 2025-04-14 ENCOUNTER — OFFICE VISIT (OUTPATIENT)
Dept: FAMILY MEDICINE | Facility: CLINIC | Age: 60
End: 2025-04-14
Payer: COMMERCIAL

## 2025-04-14 VITALS
DIASTOLIC BLOOD PRESSURE: 74 MMHG | HEART RATE: 83 BPM | WEIGHT: 156.31 LBS | HEIGHT: 62 IN | BODY MASS INDEX: 28.76 KG/M2 | OXYGEN SATURATION: 95 % | SYSTOLIC BLOOD PRESSURE: 118 MMHG

## 2025-04-14 DIAGNOSIS — M54.12 CERVICAL RADICULOPATHY: Primary | ICD-10-CM

## 2025-04-14 DIAGNOSIS — M54.2 CERVICAL PAIN (NECK): ICD-10-CM

## 2025-04-14 PROCEDURE — 72040 X-RAY EXAM NECK SPINE 2-3 VW: CPT | Mod: TC,FY,PO

## 2025-04-14 PROCEDURE — 3008F BODY MASS INDEX DOCD: CPT | Mod: CPTII,S$GLB,,

## 2025-04-14 PROCEDURE — 72040 X-RAY EXAM NECK SPINE 2-3 VW: CPT | Mod: 26,,, | Performed by: RADIOLOGY

## 2025-04-14 PROCEDURE — 99214 OFFICE O/P EST MOD 30 MIN: CPT | Mod: S$GLB,,,

## 2025-04-14 PROCEDURE — 1159F MED LIST DOCD IN RCRD: CPT | Mod: CPTII,S$GLB,,

## 2025-04-14 PROCEDURE — 99999 PR PBB SHADOW E&M-EST. PATIENT-LVL IV: CPT | Mod: PBBFAC,,,

## 2025-04-14 PROCEDURE — 3078F DIAST BP <80 MM HG: CPT | Mod: CPTII,S$GLB,,

## 2025-04-14 PROCEDURE — 3074F SYST BP LT 130 MM HG: CPT | Mod: CPTII,S$GLB,,

## 2025-04-14 PROCEDURE — 1160F RVW MEDS BY RX/DR IN RCRD: CPT | Mod: CPTII,S$GLB,,

## 2025-04-14 RX ORDER — METHOCARBAMOL 500 MG/1
500 TABLET, FILM COATED ORAL 3 TIMES DAILY
Qty: 30 TABLET | Refills: 0 | Status: SHIPPED | OUTPATIENT
Start: 2025-04-14 | End: 2025-04-14

## 2025-04-14 RX ORDER — METHOCARBAMOL 500 MG/1
500 TABLET, FILM COATED ORAL 3 TIMES DAILY PRN
Qty: 30 TABLET | Refills: 0 | Status: SHIPPED | OUTPATIENT
Start: 2025-04-14 | End: 2025-04-24

## 2025-04-14 NOTE — PROGRESS NOTES
[facility]    4/14/2025      Subjective:       Patient ID:  Megan is a 60 y.o. female .  has a past medical history of Postmenopausal bleeding and Thyroid disease.    History of Present Illness    CHIEF COMPLAINT:  Ms. Hurtado presents today for neck pain with episodes of immobility    NECK PAIN:  She reports neck pain that started 2 months ago with episodes of immobility lasting approximately 10 seconds. The pain is localized to the back of the neck at the first vertebra. She experiences cramping on the left side of her neck causing shock sensations on the left side.She denies radiation of pain down her arm. No treatment has been initiated.    SOCIAL HISTORY:  She works as a .      ROS:  General: -fever, -chills, -fatigue, -weight gain, -weight loss  Eyes: +vision changes, -redness, -discharge  ENT: -ear pain, -nasal congestion, -sore throat  Cardiovascular: -chest pain, -palpitations, -lower extremity edema  Respiratory: -cough, -shortness of breath  Gastrointestinal: -abdominal pain, -nausea, -vomiting, -diarrhea, -constipation, -blood in stool  Genitourinary: -dysuria, -hematuria, -frequency  Musculoskeletal: -joint pain, -muscle pain, +neck pain, +neck stiffness, +muscle cramps  Skin: -rash, -lesion  Neurological: -headache, -dizziness, -numbness, +tingling  Psychiatric: -anxiety, -depression, -sleep difficulty           Problem List[1]      Last HgbA1C:    Lab Results   Component Value Date    HGBA1C 5.6 07/23/2024    HGBA1C 5.5 04/21/2023    HGBA1C 5.7 (H) 02/19/2022         Last Lipid Panel:    Lab Results   Component Value Date    HDL 41 07/23/2024    HDL 44 04/21/2023    HDL 50 02/19/2022       Lab Results   Component Value Date    LDLCALC 93.0 07/23/2024    LDLCALC 124.2 04/21/2023    LDLCALC 124.6 02/19/2022       Lab Results   Component Value Date    TRIG 230 (H) 07/23/2024    TRIG 109 04/21/2023    TRIG 107 02/19/2022       Lab Results   Component Value  "Date    CHOLHDL 22.8 07/23/2024    CHOLHDL 23.2 04/21/2023    CHOLHDL 25.5 02/19/2022         Review of patient's allergies indicates:   Allergen Reactions    Vitamin a Rash        Medication List with Changes/Refills   New Medications    METHOCARBAMOL (ROBAXIN) 500 MG TAB    Take 1 tablet (500 mg total) by mouth 3 (three) times daily as needed.   Current Medications    CETIRIZINE (ZYRTEC) 10 MG TABLET    Take 1 tablet (10 mg total) by mouth once daily.    DIPHENHYDRAMINE (BENADRYL) 25 MG CAPSULE    Take 1 capsule (25 mg total) by mouth every 6 (six) hours as needed for Itching or Allergies.    ERGOCALCIFEROL (ERGOCALCIFEROL) 50,000 UNIT CAP    Take 50,000 Units by mouth every 7 days.    FAMOTIDINE (PEPCID) 40 MG TABLET    Take 1 tablet (40 mg total) by mouth once daily.    HYDROCORTISONE 2.5 % CREAM    Apply topically 2 (two) times daily.    LEVOTHYROXINE (SYNTHROID) 50 MCG TABLET    Take 1 tablet (50 mcg total) by mouth before breakfast.    LINACLOTIDE (LINZESS) 145 MCG CAP CAPSULE    Take 1 capsule (145 mcg total) by mouth before breakfast.    LINACLOTIDE (LINZESS) 145 MCG CAP CAPSULE    Take 1 capsule (145 mcg total) by mouth before breakfast.    MUPIROCIN (BACTROBAN) 2 % OINTMENT    Apply topically 3 (three) times daily.    PANTOPRAZOLE (PROTONIX) 40 MG TABLET    Take 1 tablet (40 mg total) by mouth once daily.    PHENYLEPHRINE-COCOA BUTTER 0.25-88.44 % SUPP SUPPOSITORY    Place 1 suppository rectally 2 (two) times a day.    SILVER SULFADIAZINE 1% (SILVADENE) 1 % CREAM    Apply topically 2 (two) times daily.               Objective:      /74 (BP Location: Left arm, Patient Position: Sitting)   Pulse 83   Ht 5' 2" (1.575 m)   Wt 70.9 kg (156 lb 4.9 oz)   SpO2 95%   BMI 28.59 kg/m²   Estimated body mass index is 28.59 kg/m² as calculated from the following:    Height as of this encounter: 5' 2" (1.575 m).    Weight as of this encounter: 70.9 kg (156 lb 4.9 oz).    Physical Exam  Vitals reviewed. "   Constitutional:       Appearance: Normal appearance.   HENT:      Head: Normocephalic and atraumatic.   Eyes:      General: No scleral icterus.     Conjunctiva/sclera: Conjunctivae normal.   Cardiovascular:      Rate and Rhythm: Normal rate.   Pulmonary:      Effort: Pulmonary effort is normal. No respiratory distress.   Musculoskeletal:         General: Tenderness (cervical spine) present.      Cervical back: Normal range of motion.      Comments: Mild pain with neck movements especially with lateral flexion    Skin:     Coloration: Skin is not pale.   Neurological:      Mental Status: She is alert and oriented to person, place, and time.   Psychiatric:         Mood and Affect: Mood normal.         Behavior: Behavior normal.         Thought Content: Thought content normal.             Assessment and Plan:   1. Cervical pain (neck)  - X-Ray Cervical Spine AP And Lateral; Future  - methocarbamoL (ROBAXIN) 500 MG Tab; Take 1 tablet (500 mg total) by mouth 3 (three) times daily as needed.  Dispense: 30 tablet; Refill: 0     Assessment & Plan    - Assessed neck pain, noting location in back of neck near first vertebra and occasional left-sided cramping.  CERVICAL RADICULOPATHY:   Prescribed muscle relaxer robaxin for symptom management and neck tightness.   Ordered cervical spine x-ray to further evaluate pain etiology.   Physical exam reveals pain with lateral head movements.  Will follow up pending x ray results         The patient was informed of the following statements     Emergency Care:Seek immediate medical attention in the emergency room if you experience any new or worsening symptoms, or if your current condition significantly changes or becomes more severe.  Patient Acknowledgment: Patient verbalizes understanding of the plan and agrees to proceed with the recommended care.        Follow Up:  7/24/2025 Mary Hart MD                  No follow-ups on file.    Other Orders Placed This Visit:  Orders Placed  This Encounter   Procedures    X-Ray Cervical Spine AP And Lateral         This note was generated with the assistance of ambient listening technology. Verbal consent was obtained by the patient and accompanying visitor(s) for the recording of patient appointment to facilitate this note. I attest to having reviewed and edited the generated note for accuracy, though some syntax or spelling errors may persist. Please contact the author of this note for any clarification.        Logan Jordan PA-C             [1]   Patient Active Problem List  Diagnosis    Acquired hypothyroidism    GERD without esophagitis    History of colon polyps    Prediabetes    Moderate mixed hyperlipidemia not requiring statin therapy    Adjustment disorder with depressed mood    Female pattern hair loss    History of basal cell carcinoma (BCC) of skin    Chronic bilateral low back pain with right-sided sciatica    Calcaneal spur of foot, right    Right foot pain    Impaired gait and mobility    Impaired functional mobility and activity tolerance

## 2025-04-15 ENCOUNTER — RESULTS FOLLOW-UP (OUTPATIENT)
Dept: FAMILY MEDICINE | Facility: CLINIC | Age: 60
End: 2025-04-15

## 2025-04-15 DIAGNOSIS — M54.12 CERVICAL RADICULOPATHY: Primary | ICD-10-CM

## 2025-04-22 ENCOUNTER — HOSPITAL ENCOUNTER (OUTPATIENT)
Dept: RADIOLOGY | Facility: HOSPITAL | Age: 60
Discharge: HOME | End: 2025-04-22
Attending: STUDENT IN AN ORGANIZED HEALTH CARE EDUCATION/TRAINING PROGRAM
Payer: COMMERCIAL

## 2025-04-22 ENCOUNTER — OFFICE VISIT (OUTPATIENT)
Dept: PAIN MEDICINE | Facility: CLINIC | Age: 60
End: 2025-04-22
Payer: COMMERCIAL

## 2025-04-22 VITALS
BODY MASS INDEX: 28.52 KG/M2 | HEART RATE: 70 BPM | WEIGHT: 155 LBS | HEIGHT: 62 IN | DIASTOLIC BLOOD PRESSURE: 70 MMHG | SYSTOLIC BLOOD PRESSURE: 105 MMHG

## 2025-04-22 DIAGNOSIS — M54.12 CERVICAL RADICULOPATHY: ICD-10-CM

## 2025-04-22 DIAGNOSIS — M50.30 DDD (DEGENERATIVE DISC DISEASE), CERVICAL: Primary | ICD-10-CM

## 2025-04-22 DIAGNOSIS — M50.30 DDD (DEGENERATIVE DISC DISEASE), CERVICAL: ICD-10-CM

## 2025-04-22 DIAGNOSIS — M25.551 RIGHT HIP PAIN: ICD-10-CM

## 2025-04-22 PROCEDURE — 72040 X-RAY EXAM NECK SPINE 2-3 VW: CPT | Mod: 26,,, | Performed by: RADIOLOGY

## 2025-04-22 PROCEDURE — 99999 PR PBB SHADOW E&M-EST. PATIENT-LVL V: CPT | Mod: PBBFAC,,, | Performed by: STUDENT IN AN ORGANIZED HEALTH CARE EDUCATION/TRAINING PROGRAM

## 2025-04-22 PROCEDURE — 1160F RVW MEDS BY RX/DR IN RCRD: CPT | Mod: CPTII,S$GLB,, | Performed by: STUDENT IN AN ORGANIZED HEALTH CARE EDUCATION/TRAINING PROGRAM

## 2025-04-22 PROCEDURE — 3008F BODY MASS INDEX DOCD: CPT | Mod: CPTII,S$GLB,, | Performed by: STUDENT IN AN ORGANIZED HEALTH CARE EDUCATION/TRAINING PROGRAM

## 2025-04-22 PROCEDURE — G2211 COMPLEX E/M VISIT ADD ON: HCPCS | Mod: S$GLB,,, | Performed by: STUDENT IN AN ORGANIZED HEALTH CARE EDUCATION/TRAINING PROGRAM

## 2025-04-22 PROCEDURE — 73521 X-RAY EXAM HIPS BI 2 VIEWS: CPT | Mod: 26,,, | Performed by: RADIOLOGY

## 2025-04-22 PROCEDURE — 3078F DIAST BP <80 MM HG: CPT | Mod: CPTII,S$GLB,, | Performed by: STUDENT IN AN ORGANIZED HEALTH CARE EDUCATION/TRAINING PROGRAM

## 2025-04-22 PROCEDURE — 73521 X-RAY EXAM HIPS BI 2 VIEWS: CPT | Mod: TC

## 2025-04-22 PROCEDURE — 1159F MED LIST DOCD IN RCRD: CPT | Mod: CPTII,S$GLB,, | Performed by: STUDENT IN AN ORGANIZED HEALTH CARE EDUCATION/TRAINING PROGRAM

## 2025-04-22 PROCEDURE — 99204 OFFICE O/P NEW MOD 45 MIN: CPT | Mod: S$GLB,,, | Performed by: STUDENT IN AN ORGANIZED HEALTH CARE EDUCATION/TRAINING PROGRAM

## 2025-04-22 PROCEDURE — 3074F SYST BP LT 130 MM HG: CPT | Mod: CPTII,S$GLB,, | Performed by: STUDENT IN AN ORGANIZED HEALTH CARE EDUCATION/TRAINING PROGRAM

## 2025-04-22 PROCEDURE — 72040 X-RAY EXAM NECK SPINE 2-3 VW: CPT | Mod: TC

## 2025-04-22 NOTE — PROGRESS NOTES
Chronic Pain - New Consult    Referring Physician: Logan Jordan, *    Chief Complaint:   Chief Complaint   Patient presents with    Neck Pain     Back of neck with electrical shocks     Groin Pain     Radiating down right leg.           SUBJECTIVE:    Megan Hurtado presents to the clinic for the evaluation of neck (back of head) and groin pain. The pain started 2 months ago following no inciting event and symptoms have been unchanged.The pain is located in the neck area with no radiation.  The pain is described as sharp and stabbing and is rated as 0/10 (neck) and 8/10 (leg) The pain is rated with a score of  6/10 on the BEST day and a score of 8/10 on the WORST day.  Symptoms interfere with daily activity. The pain is exacerbated by turning on side and walking/standing.  The pain is mitigated by sitting.     Patient denies urinary incontinence, bowel incontinence (although has known stress incontinence), and significant motor weakness. She reports a neck pain that paralyzes her for 30 seconds, and a cramp on the left side. She also pain in her right leg (hip to the knee).    Physical Therapy/Home Exercise: no     Pain Disability Index Review:      4/22/2025     2:10 PM   Last 3 PDI Scores   Pain Disability Index (PDI) 56       Pain Medications:   - nothing     report:  Reviewed and consistent with medication use as prescribed.    Pain Procedures:   None    Imaging:   XR CERVICAL SPINE AP LATERAL     CLINICAL HISTORY:  Cervicalgia     TECHNIQUE:  AP, lateral and open mouth views of the cervical spine were performed.     COMPARISON:  None.     FINDINGS:  There is grade 1 anterolisthesis of C6 on C7.  Vertebral body heights and alignment are preserved otherwise.  There is advanced spondylitic spurring and disc space narrowing at C5-6.  The remainder of the disc spaces appear preserved.  There is upper and lower cervical facet arthritis.     No prevertebral soft tissue swelling.     Impression:      Degenerative changes        Electronically signed by:Arnie Dumont Darren Dewitt  Date:                                            04/15/2025  Time:                                           08:50    Past Medical History:   Diagnosis Date    Postmenopausal bleeding 2021    Thyroid disease      Past Surgical History:   Procedure Laterality Date    AXILLARY SURGERY      mass excised, benign     SECTION, LOW TRANSVERSE      three    CHOLECYSTECTOMY      COLONOSCOPY N/A 2024    Procedure: COLONOSCOPY;  Surgeon: Parveen Willams MD;  Location: [facility] ENDOSCOPY;  Service: Endoscopy;  Laterality: N/A;    ESOPHAGOGASTRODUODENOSCOPY N/A 2024    Procedure: EGD (ESOPHAGOGASTRODUODENOSCOPY);  Surgeon: Parveen Willams MD;  Location: [facility] ENDOSCOPY;  Service: Endoscopy;  Laterality: N/A;  PORTAL/IN PERSON/PEG-DW   Pre call attempted. Left VM. Instr not read. SG  24-R/S to 24, updated instr portal-DS     Social History[1]  Family History   Problem Relation Name Age of Onset    Breast cancer Mother         Review of patient's allergies indicates:   Allergen Reactions    Vitamin a Rash       Current Outpatient Medications   Medication Sig    cetirizine (ZYRTEC) 10 MG tablet Take 1 tablet (10 mg total) by mouth once daily.    diphenhydrAMINE (BENADRYL) 25 mg capsule Take 1 capsule (25 mg total) by mouth every 6 (six) hours as needed for Itching or Allergies.    ergocalciferol (ERGOCALCIFEROL) 50,000 unit Cap Take 50,000 Units by mouth every 7 days.    famotidine (PEPCID) 40 MG tablet Take 1 tablet (40 mg total) by mouth once daily.    hydrocortisone 2.5 % cream Apply topically 2 (two) times daily.    levothyroxine (SYNTHROID) 50 MCG tablet Take 1 tablet (50 mcg total) by mouth before breakfast.    linaCLOtide (LINZESS) 145 mcg Cap capsule Take 1 capsule (145 mcg total) by mouth before breakfast.    linaCLOtide (LINZESS) 145 mcg Cap capsule Take 1 capsule (145 mcg total) by mouth before  "breakfast.    methocarbamoL (ROBAXIN) 500 MG Tab Take 1 tablet (500 mg total) by mouth 3 (three) times daily as needed.    mupirocin (BACTROBAN) 2 % ointment Apply topically 3 (three) times daily.    pantoprazole (PROTONIX) 40 MG tablet Take 1 tablet (40 mg total) by mouth once daily.    phenylephrine-cocoa butter 0.25-88.44 % Supp suppository Place 1 suppository rectally 2 (two) times a day.    silver sulfADIAZINE 1% (SILVADENE) 1 % cream Apply topically 2 (two) times daily.     No current facility-administered medications for this visit.       REVIEW OF SYSTEMS:    GENERAL:  current fevers or chills, recent use of antibiotics denies.  HEENT:  History of migraines/headaches: denies, History of major throat surgery: denies  RESPIRATORY:  History of home oxygen or pulmonary hypertension/severe breathing dysfunction: denies  ; Smoking history: none  CARDIOVASCULAR:  Hx of palpitations/rhythm problems: denies Hx of Heart Attacks/Surgery: denies  GI:  Recent abdominal discomfort or recent change in bowel habits denies  MUSCULOSKELETAL:  See HPI.  SKIN:  unhealed wounds or rashes: denies  PSYCH: major psychiatric history or recent psychosocial stressors denies  HEMATOLOGY/LYMPHOLOGY:  Hx of prolonged bleeding, Hx of Blood thinner usage: denies  NEURO:   history of seizures, strokes, chronic/old weakness (such as paralysis or paresis of any body part): denies  All other reviewed and negative other than HPI.    OBJECTIVE:    /70 (BP Location: Left arm, Patient Position: Sitting)   Pulse 70   Ht 5' 2" (1.575 m)   Wt 70.3 kg (154 lb 15.7 oz)   BMI 28.35 kg/m²     PHYSICAL EXAMINATION:  General appearance: Well appearing, in no acute distress, alert and appropriately communicative.  Psych:  Mood and affect appropriate.  Skin: Skin color, texture, turgor normal, no rashes or lesions, in both upper and lower body for exposed skin.  Head/face:  Atraumatic, normocephalic.  Neck: slight pain to palpation over the " cervical paraspinous muscles on the left. Spurling Negative. No pain with neck flexion, extension, or lateral flexion. .  Cor: regular rate  Pulm: non-labored breathing  GI: Abdomen non-distended and non-tender.  Back: Straight leg raising in the sitting and supine positions is negative to radicular pain. slight pain to palpation over the spine or paraspinal muscles. Normal range of motion without pain reproduction.  Extremities: No deformities, significant lymphedema, or skin discoloration. No significant open wounds. No major amputations.  Musculoskeletal: Shoulder, hip, sacroiliac and knee provocative maneuvers are negative with the exception of right > left FADIR, bilateral GTB bursitis. Bilateral upper and lower extremity strength is normal and symmetric none.  No atrophy or tone abnormalities are noted.  Neuro: Bilateral upper and lower extremity coordination and muscle stretch reflexes abnormalities noted: none.  Hood and/or Clonus: negative; loss of sensation to light touch: none  Gait: normal    CMP  Sodium   Date Value Ref Range Status   07/23/2024 140 136 - 145 mmol/L Final     Potassium   Date Value Ref Range Status   07/23/2024 4.1 3.5 - 5.1 mmol/L Final     Chloride   Date Value Ref Range Status   07/23/2024 104 95 - 110 mmol/L Final     CO2   Date Value Ref Range Status   07/23/2024 28 23 - 29 mmol/L Final     Glucose   Date Value Ref Range Status   07/23/2024 86 70 - 110 mg/dL Final     BUN   Date Value Ref Range Status   07/23/2024 6 6 - 20 mg/dL Final     Creatinine   Date Value Ref Range Status   07/23/2024 0.7 0.5 - 1.4 mg/dL Final     Calcium   Date Value Ref Range Status   07/23/2024 9.1 8.7 - 10.5 mg/dL Final     Total Protein   Date Value Ref Range Status   07/23/2024 7.4 6.0 - 8.4 g/dL Final     Albumin   Date Value Ref Range Status   07/23/2024 4.0 3.5 - 5.2 g/dL Final   07/23/2024 4.0 3.5 - 5.2 g/dL Final     Total Bilirubin   Date Value Ref Range Status   07/23/2024 0.3 0.1 - 1.0 mg/dL  Final     Comment:     For infants and newborns, interpretation of results should be based  on gestational age, weight and in agreement with clinical  observations.    Premature Infant recommended reference ranges:  Up to 24 hours.............<8.0 mg/dL  Up to 48 hours............<12.0 mg/dL  3-5 days..................<15.0 mg/dL  6-29 days.................<15.0 mg/dL       Alkaline Phosphatase   Date Value Ref Range Status   07/23/2024 86 55 - 135 U/L Final     AST   Date Value Ref Range Status   07/23/2024 21 10 - 40 U/L Final     ALT   Date Value Ref Range Status   07/23/2024 19 10 - 44 U/L Final     Anion Gap   Date Value Ref Range Status   07/23/2024 8 8 - 16 mmol/L Final     eGFR   Date Value Ref Range Status   07/23/2024 >60.0 >60 mL/min/1.73 m^2 Final         ASSESSMENT: 60 y.o. year old female with chronic pain, consistent with:    1. DDD (degenerative disc disease), cervical  X-Ray Cervical Spine Flexion And Extension Only    Ambulatory Referral/Consult to Physical Therapy      2. Cervical radiculopathy  Ambulatory referral/consult to Spine Care    Ambulatory Referral/Consult to Physical Therapy      3. Right hip pain  Ambulatory Referral/Consult to Physical Therapy    X-Ray Hips Bilateral 2 View Incl AP Pelvis        IMPRESSION: Megan Hurtado presents today for neck and bilateral (right > left) hip pain.   History and physical exam are consistent with axial neck pain/cervical facetogenic pain and right > left hip pain.  My independent interpretation of the imaging is consistent with cervical degenerative disc disease with C6/7 lithesis.  We will start with basic imaging and conservative management (physical therapy and non-narcotic medications). If their pain persists despite conservative measures, we will consider advanced imaging and/or interventions in an effort to give them additional relief for their pain.    PLAN:   - I have stressed the importance of physical activity and a home exercise plan to  help with pain and improve health.  - Patient can continue with medications for now since they are providing benefits, using them appropriately, and without side effects.  - xray cervical spine with flexion/extension (due to listhesis at C6/7)  - xray bilateral hip/pelvis  - referral to physical therapy for bilateral hips and neck pain  - RTC in 2 - 3 months to discuss advanced imaging and/or interventions, if indicated at that time  - Counseled patient regarding the importance of activity modification and physical therapy.    The above plan and management options were discussed at length with patient. Patient is in agreement with the above and verbalized understanding. It will be communicated with the referring physician via electronic record, fax, or mail.    Kiaraleonard Sandoval  04/22/2025         [1]   Social History  Socioeconomic History    Marital status: Single   Tobacco Use    Smoking status: Never    Smokeless tobacco: Never   Substance and Sexual Activity    Alcohol use: Never    Drug use: Never    Sexual activity: Yes     Partners: Male     Birth control/protection: None     Social Drivers of Health     Financial Resource Strain: Medium Risk (4/4/2023)    Overall Financial Resource Strain (CARDIA)     Difficulty of Paying Living Expenses: Somewhat hard   Food Insecurity: Food Insecurity Present (4/4/2023)    Hunger Vital Sign     Worried About Running Out of Food in the Last Year: Often true     Ran Out of Food in the Last Year: Often true   Transportation Needs: No Transportation Needs (4/4/2023)    PRAPARE - Transportation     Lack of Transportation (Medical): No     Lack of Transportation (Non-Medical): No   Physical Activity: Sufficiently Active (4/4/2023)    Exercise Vital Sign     Days of Exercise per Week: 5 days     Minutes of Exercise per Session: 50 min   Stress: No Stress Concern Present (4/4/2023)    [employer] - Occupational Stress Questionnaire     Feeling of Stress :  Only a little   Housing Stability: Unknown (4/4/2023)    Housing Stability Vital Sign     Unable to Pay for Housing in the Last Year: No     Unstable Housing in the Last Year: No

## 2025-04-23 ENCOUNTER — RESULTS FOLLOW-UP (OUTPATIENT)
Dept: PAIN MEDICINE | Facility: CLINIC | Age: 60
End: 2025-04-23

## 2025-05-08 ENCOUNTER — CLINICAL SUPPORT (OUTPATIENT)
Dept: REHABILITATION | Facility: HOSPITAL | Age: 60
End: 2025-05-08
Attending: STUDENT IN AN ORGANIZED HEALTH CARE EDUCATION/TRAINING PROGRAM
Payer: COMMERCIAL

## 2025-05-08 DIAGNOSIS — Z74.09 IMPAIRED FUNCTIONAL MOBILITY AND ACTIVITY TOLERANCE: ICD-10-CM

## 2025-05-08 DIAGNOSIS — M25.551 RIGHT HIP PAIN: ICD-10-CM

## 2025-05-08 DIAGNOSIS — M50.30 DDD (DEGENERATIVE DISC DISEASE), CERVICAL: ICD-10-CM

## 2025-05-08 DIAGNOSIS — M54.12 CERVICAL RADICULOPATHY: ICD-10-CM

## 2025-05-08 DIAGNOSIS — R53.1 DECREASED STRENGTH: Primary | ICD-10-CM

## 2025-05-08 PROCEDURE — 97161 PT EVAL LOW COMPLEX 20 MIN: CPT | Mod: PN

## 2025-05-08 PROCEDURE — 97530 THERAPEUTIC ACTIVITIES: CPT | Mod: PN

## 2025-05-09 PROBLEM — R26.89 IMPAIRED GAIT AND MOBILITY: Status: RESOLVED | Noted: 2023-05-11 | Resolved: 2025-05-09

## 2025-05-09 PROBLEM — R53.1 DECREASED STRENGTH: Status: ACTIVE | Noted: 2025-05-09

## 2025-05-09 PROBLEM — Z74.09 IMPAIRED FUNCTIONAL MOBILITY AND ACTIVITY TOLERANCE: Status: ACTIVE | Noted: 2025-05-09

## 2025-05-09 PROBLEM — Z74.09 IMPAIRED FUNCTIONAL MOBILITY AND ACTIVITY TOLERANCE: Status: RESOLVED | Noted: 2024-07-17 | Resolved: 2025-05-09

## 2025-05-09 NOTE — PROGRESS NOTES
Outpatient Rehab    Physical Therapy Evaluation    Patient Name: Megan Hurtado  MRN: [id number]  YOB: 1965  Encounter Date: 5/8/2025    Therapy Diagnosis:   Encounter Diagnoses   Name Primary?    Cervical radiculopathy     DDD (degenerative disc disease), cervical     Right hip pain     Decreased strength Yes    Impaired functional mobility and activity tolerance      Physician: Julio Sandoval MD    Physician Orders: Eval and Treat  Medical Diagnosis: Cervical radiculopathy  DDD (degenerative disc disease), cervical  Right hip pain    Visit # / Visits Authorized:  1 / 1  Insurance Authorization Period: 4/22/2025 to 12/31/2025  Date of Evaluation: 5/8/2025  Plan of Care Certification: 5/8/2025 to 6/18/2025      Time In: 0308   Time Out: 0400  Total Time (in minutes): 52   Total Billable Time (in minutes): 52    Intake Outcome Measure for FOTO Survey    Therapist reviewed FOTO scores for Megan Hurtado on 5/8/2025.   FOTO report - see Media section or FOTO account episode details.     Intake Score: 44%         Subjective   History of Present Illness  Megan is a 60 y.o. female          Diagnostic tests related to this condition: X-ray.   X-Ray Details: No acute fracture, dislocation or osseous destructive process.  Hip joint spaces and alignment are relatively maintained noting mild lateral spurring.  Sacroiliac joints are symmetric.  Soft tissues show no significant abnormalities.    History of Present Condition/Illness: Patient complains of right hip pain that began insidiously. Pain started after stretching in the morning time in a butterfly position. Localizes pain to the joint itself with c-sign . Also, endorses pain that travels down the leg. Denies numbness/tingling in lower extremity. She also sometimes gets radiating pain down left lower extremity described as radiating, but not numbness/tingling. Denies shooting or electricity type. Activities that increase pain in right hip include walking,  crossing her legs, standing, and stair negotiation. Pain with sleeping on her right side, unable.  Unable to cross her right leg over her left left. If she goes to do a quick pivoting movement or bend forwards, her hip will bite her.    Pain     Patient reports a current pain level of 7/10. Pain at best is reported as 5/10. Pain at worst is reported as 10/10.   Location: R lateral hip  Clinical Progression (since onset): Worsening  Pain Qualities: Knife-like, Aching, Dull, Radiating         Living Arrangements  Living Situation  Housing: Home independently  Living Arrangements: Parent    Home Setup  Type of Structure: House        Employment  Employment Status: Employed full-time   works as a house keeper.       Past Medical History/Physical Systems Review:   Megan Hurtado  has a past medical history of Postmenopausal bleeding and Thyroid disease.    Megan Hurtado  has a past surgical history that includes  section, low transverse; Cholecystectomy; Axillary Surgery; Esophagogastroduodenoscopy (N/A, 2024); and Colonoscopy (N/A, 2024).    Megan has a current medication list which includes the following prescription(s): cetirizine, diphenhydramine, ergocalciferol, famotidine, hydrocortisone, levothyroxine, linaclotide, linaclotide, mupirocin, pantoprazole, phenylephrine-cocoa butter, and silver sulfadiazine 1%.    Review of patient's allergies indicates:   Allergen Reactions    Vitamin a Rash        Objective      Lower Extremity Sensation  Right Lumbar/Lower Extremity Sensation  Intact: Light Touch       Left Lumbar/Lower Extremity Sensation  Intact: Light Touch                Hip Palpation     (+) groin, hip flexor tendon, greater trochanter       (+) greater trochanter, groin                Hip Strength - Planes of Motion   Right Strength Right Pain Left Strength Left  Pain   Flexion (L2) 4-   4     Extension           ABduction           ADduction           Internal Rotation 5   5     External  "Rotation 4+   4+         Knee Strength   Right Strength Right Pain Left Strength Left  Pain   Flexion (S2) 4+   4+     Prone Flexion           Extension (L3) 5   5            Ankle/Foot Strength - Planes of Motion   Right Strength Right Pain Left Strength Left  Pain   Dorsiflexion (L4) 5   5     Plantar Flexion (S1) 4+   4+     Inversion           Eversion           Great Toe Flexion           Great Toe Extension (L5)           Lesser Toes Flexion           Lesser Toes Extension                  Lumbar/Pelvic Girdle Special Tests  Pelvic Girdle / Sacrum Tests  Positive: Right AAKASH  Negative: Left AAKASH, Right FADIR, and Left FADIR  Negative: Right Thigh Thrust/Posterior Shear and Left Thigh Thrust/Posterior Shear         Hip Special Tests  Intra-Articular/Impingement Tests  Positive: Right AAKASH  Negative: Left AAKASH, Right FADIR, Left FADIR, Right Scour, and Left Scour  Sacroiliac Joint Tests  Negative: Right Thigh Thrust/Posterior Shear and Left Thigh Thrust/Posterior Shear  Straight Leg Raise Test  Negative: Right and Left       Single leg stance: (+) right lateral hip pain; Stinchfiled's: (-) bilateral; AAKASH (+) right            Treatment:  Therapeutic Activity  TA 1: hip abduction isometric: 20x5"  TA 2: bridge: 2x10    Time Entry(in minutes):  PT Evaluation (Low) Time Entry: 29  Therapeutic Activity Time Entry: 23    Assessment & Plan   Assessment  Megan presents with a condition of Low complexity.   Presentation of Symptoms: Changing  Will Comorbidities Impact Care: Yes  adjustment disorder with depressed mood, mixed HLD, BMI, GERD    Functional Limitations: Activity tolerance, Bed mobility, Completing work/school activities, Decreased ambulation distance/endurance, Gait limitations, Range of motion, Performing household chores, Participating in leisure activities, Painful locomotion/ambulation, Pain with ADLs/IADLs, Standing tolerance, Squatting  Impairments: Abnormal gait, Abnormal muscle firing, Activity " intolerance, Abnormal or restricted range of motion, Pain with functional activity, Impaired physical strength  Personal Factors Affecting Prognosis: Language barrier    Patient Goal for Therapy (PT): Improve pain with ADL's and work tasks  Prognosis: Good  Assessment Details: Patient presents with onset of subacute right hip pain. Difficulty assessing objective tests and measures secondary to high irritability and language barrier despite use of translation services. Complaints of right lower extremity referral pain, but negative neural tension testing. Pain with palpation at greater trochanter, resisted FADER, and single leg stance. May suggest potential gluteal tendinopathy and/or hip bursitis involvement. Education to avoid crossing right lower extremity past midline, pillow between knees at night, and avoiding weight shifting onto right lower extremity.      Plan  From a physical therapy perspective, the patient would benefit from: Skilled Rehab Services    Planned therapy interventions include: Therapeutic activities, Therapeutic exercise, Neuromuscular re-education, Manual therapy, ADLs/IADLs, Gait training, and Community/work reintegration.    Planned modalities to include: Cryotherapy (cold pack) and Thermotherapy (hot pack).        Visit Frequency: 2 times Per Week for 6 Weeks.       This plan was discussed with Patient and Therapy assistant.   Discussion participants: Agreed Upon Plan of Care             Patient's spiritual, cultural, and educational needs considered and patient agreeable to plan of care and goals.     Education  Education was done with Patient. The patient's learning style includes Demonstration and Listening. The patient Demonstrates understanding and Verbalizes understanding.                 Goals:   Active       Ambulation/movement       Patient will ascend/descend stairs without complaints of lateral hip pain.       Start:  05/09/25    Expected End:  06/20/25               Changing  body position       Patient will demonstrate ability to squat with appropriate mechanics for >10 reps to improve tolerance to work tasks.       Start:  05/09/25    Expected End:  06/20/25               Functional outcome       Patient stated goal: Improve pain with ADL's and work tasks        Start:  05/09/25    Expected End:  06/20/25               Home activities       Patient will report hip pain levels while working </= 2/10 to return to pain-free working.        Start:  05/09/25    Expected End:  06/20/25               Pain       Patient will report pain of <2/10 demonstrating a reduction of overall pain       Start:  05/09/25    Expected End:  05/23/25               Range of Motion       Patient will achieve right hip flexion  degrees       Start:  05/09/25    Expected End:  05/23/25               Strength       Patient will achieve right hip flexion strength of 5/5       Start:  05/09/25    Expected End:  06/20/25            Patient will achieve right hip abduction strength of 5/5       Start:  05/09/25    Expected End:  06/20/25               Working       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       Start:  05/09/25    Expected End:  06/20/25                Sola Singh, PT

## 2025-05-15 ENCOUNTER — CLINICAL SUPPORT (OUTPATIENT)
Dept: REHABILITATION | Facility: HOSPITAL | Age: 60
End: 2025-05-15
Payer: COMMERCIAL

## 2025-05-15 DIAGNOSIS — M50.30 DDD (DEGENERATIVE DISC DISEASE), CERVICAL: Primary | ICD-10-CM

## 2025-05-15 DIAGNOSIS — M25.551 RIGHT HIP PAIN: ICD-10-CM

## 2025-05-15 DIAGNOSIS — Z74.09 IMPAIRED FUNCTIONAL MOBILITY AND ACTIVITY TOLERANCE: ICD-10-CM

## 2025-05-15 PROCEDURE — 97110 THERAPEUTIC EXERCISES: CPT | Mod: PN,CQ

## 2025-05-15 PROCEDURE — 97112 NEUROMUSCULAR REEDUCATION: CPT | Mod: PN,CQ

## 2025-05-15 NOTE — PROGRESS NOTES
"  Outpatient Rehab    Physical Therapy Visit    Patient Name: Megan Hurtado  MRN: [id number]  YOB: 1965  Encounter Date: 5/15/2025    Therapy Diagnosis:   Encounter Diagnoses   Name Primary?    DDD (degenerative disc disease), cervical Yes    Right hip pain     Impaired functional mobility and activity tolerance        Physician: Julio Sandoval MD    Physician Orders: Eval and Treat  Medical Diagnosis: Cervical radiculopathy  DDD (degenerative disc disease), cervical  Right hip pain    Visit # / Visits Authorized:  1 / 20  Insurance Authorization Period: 5/8/2025 to 12/31/2025  Date of Evaluation:   Plan of Care Certification:       PT/PTA:     Number of PTA visits since last PT visit:   Time In:     Time Out:    Total Time (in minutes):     Total Billable Time (in minutes):      FOTO:  Intake Score:  %  Survey Score 2:  %  Survey Score 3:  %    Precautions:       Subjective   Doing a little better. Walking a crossing legs and walking hurts most..  Pain reported as 6/10. Walking from the lobby 61/0    Objective            Treatment:  Therapeutic Exercise  TE 1: Hook lying Bridges 2x10 -5" holds  TE 2: Hook lying Hip ABD iso 15x5" holds  TE 3: Hook lying Hip ADD iso 15x5" holds  TE 4: Hook lying bent knee fall outs 2x10  TE 5: Side lying clams 2x10  TE 6: Side lying reverse clams 2x10  Balance/Neuromuscular Re-Education  NMR 1: Sit to stand from chair 2x10 reps  NMR 2: NEXT: SIde stepping in parellel bars 3 laps  Other Activities  Activity 1: Sci Fit B UE and LE reciprocation 10 minutes Level 2    Time Entry(in minutes):       Assessment & Plan   Assessment: Tolerated firat follow up fair. Numerous aches and pains. Unclear if exercise discomfort of pain. Will monitor response. Excited clinic with slightly less pain. .       Patient will continue to benefit from skilled outpatient physical therapy to address the deficits listed in the problem list box on initial evaluation, provide pt/family education and to " maximize pt's level of independence in the home and community environment.     Patient's spiritual, cultural, and educational needs considered and patient agreeable to plan of care and goals.           Plan: Continue POC    Goals:     Mathieu Lala, PTA

## 2025-05-20 ENCOUNTER — CLINICAL SUPPORT (OUTPATIENT)
Dept: REHABILITATION | Facility: HOSPITAL | Age: 60
End: 2025-05-20
Payer: COMMERCIAL

## 2025-05-20 DIAGNOSIS — M25.551 RIGHT HIP PAIN: ICD-10-CM

## 2025-05-20 DIAGNOSIS — Z74.09 IMPAIRED FUNCTIONAL MOBILITY AND ACTIVITY TOLERANCE: ICD-10-CM

## 2025-05-20 DIAGNOSIS — M50.30 DDD (DEGENERATIVE DISC DISEASE), CERVICAL: Primary | ICD-10-CM

## 2025-05-20 PROCEDURE — 97112 NEUROMUSCULAR REEDUCATION: CPT | Mod: PN,CQ

## 2025-05-20 PROCEDURE — 97140 MANUAL THERAPY 1/> REGIONS: CPT | Mod: PN,CQ

## 2025-05-20 PROCEDURE — 97110 THERAPEUTIC EXERCISES: CPT | Mod: PN,CQ

## 2025-05-20 NOTE — PROGRESS NOTES
"  Outpatient Rehab    Physical Therapy Visit    Patient Name: Megan Hurtado  MRN: [id number]  YOB: 1965  Encounter Date: 5/20/2025    Therapy Diagnosis:   Encounter Diagnoses   Name Primary?    DDD (degenerative disc disease), cervical Yes    Right hip pain     Impaired functional mobility and activity tolerance            Physician: Julio Sandoval MD    Physician Orders: Eval and Treat  Medical Diagnosis: Cervical radiculopathy  DDD (degenerative disc disease), cervical  Right hip pain    Visit # / Visits Authorized:  2 / 20  Insurance Authorization Period: 5/8/2025 to 12/31/2025  Date of Evaluation:   Plan of Care Certification:       PT/PTA:   2/5  Number of PTA visits since last PT visit:   Time In: 0500   Time Out: 0555  Total Time (in minutes): 55   Total Billable Time (in minutes): 55    FOTO:  Intake Score:  %  Survey Score 2:  %  Survey Score 3:  %    Precautions:       Subjective   Doing a little better. Walking and crossing legs and walking hurts most. Pain when getting out of the car..    Walking from the lobby 6/10    Objective            Treatment:  Therapeutic Exercise  TE 1: Hook lying Bridges 2x10 -5" holds  TE 2: Hook lying Hip ABD iso 15x5" holds  TE 3: Hook lying Hip ADD iso 15x5" holds  TE 4: Hook lying bent knee fall outs 2x10  TE 5: Side lying clams 2x10  TE 6: Side lying reverse clams 2x10  Manual Therapy  MT 1: Long axis and med>lat left hip grade I-III mobilization on traction stool  (lateral feels best)  Balance/Neuromuscular Re-Education  NMR 1: Sit to stand from chair 2x10 reps  NMR 2: Side stepping in parellel bars 3 laps  NMR 3: Step ups fwd to 4" step 2x10 B single UE support   NMR 4: Lateral step ups to 4" step 2x10 B single UE support     Time Entry(in minutes):  Manual Therapy Time Entry: 10  Neuromuscular Re-Education Time Entry: 20  Therapeutic Exercise Time Entry: 25    Assessment & Plan   Assessment: Tolerated seond  follow up well.  Will monitor response. Excited " clinic withless pain. .Benifitted from hip mobilization. Progressed to closed chain in standing.       Patient will continue to benefit from skilled outpatient physical therapy to address the deficits listed in the problem list box on initial evaluation, provide pt/family education and to maximize pt's level of independence in the home and community environment.     Patient's spiritual, cultural, and educational needs considered and patient agreeable to plan of care and goals.           Plan: Continue POC    Goals:     Mathieu Lala, PTA

## 2025-05-26 ENCOUNTER — CLINICAL SUPPORT (OUTPATIENT)
Dept: REHABILITATION | Facility: HOSPITAL | Age: 60
End: 2025-05-26
Payer: COMMERCIAL

## 2025-05-26 DIAGNOSIS — R53.1 DECREASED STRENGTH: Primary | ICD-10-CM

## 2025-05-26 DIAGNOSIS — Z74.09 IMPAIRED FUNCTIONAL MOBILITY AND ACTIVITY TOLERANCE: ICD-10-CM

## 2025-05-26 PROCEDURE — 97110 THERAPEUTIC EXERCISES: CPT | Mod: PN

## 2025-05-26 PROCEDURE — 97112 NEUROMUSCULAR REEDUCATION: CPT | Mod: PN

## 2025-05-26 NOTE — PROGRESS NOTES
"  Outpatient Rehab    Physical Therapy Visit    Patient Name: Megan Hurtado  MRN: [id number]  YOB: 1965  Encounter Date: 5/26/2025    Therapy Diagnosis:   Encounter Diagnoses   Name Primary?    Decreased strength Yes    Impaired functional mobility and activity tolerance      Physician: Julio Sandoval MD    Physician Orders: Eval and Treat  Medical Diagnosis: Cervical radiculopathy  DDD (degenerative disc disease), cervical  Right hip pain    Visit # / Visits Authorized:  3 / 20  Insurance Authorization Period: 5/8/2025 to 12/31/2025  Date of Evaluation: 5/8/2025  Plan of Care Certification: 5/9/2025 to 6/18/2025      PT/PTA:   2/5  Number of PTA visits since last PT visit:   Time In:   1605  Time Out:  1700  Total Time (in minutes):   55  Total Billable Time (in minutes):  55    FOTO:  Intake Score:  %  Survey Score 2:  %  Survey Score 3:  %    Precautions:       Subjective : Right hip is feeling a bit better.  Pain: 5/10 - right hip       Objective            Treatment:  Therapeutic Exercise  Bridges: 3x10   Straight leg raises: 3x10 - right   Side lying clamshells: 3x10 - right   Side lying reverse clamshells: 3x10 - right   Hook lying hip abduction isometric: 5"x30 - blue theraband       Balance/Neuromuscular Re-Education  Standing hip abduction: 2x10 - red theraband   Sit to stand from chair 2x10 reps  Side stepping in parellel bars 3 laps  Step ups fwd to 4" step 2x10 B single UE support   Lateral step ups to 4" step 2x10 B single UE support     Time Entry(in minutes):       Assessment & Plan   Assessment:  Patient presents with onset of subacute right hip pain. Difficulty assessing objective tests and measures secondary to high irritability and language barrier despite use of translation services. Complaints of right lower extremity referral pain, but negative neural tension testing. Pain with palpation at greater trochanter, resisted FADER, and single leg stance. Continued progressing gluteus " strength and hip stability.        Patient will continue to benefit from skilled outpatient physical therapy to address the deficits listed in the problem list box on initial evaluation, provide pt/family education and to maximize pt's level of independence in the home and community environment.     Patient's spiritual, cultural, and educational needs considered and patient agreeable to plan of care and goals.           Plan:      Goals:   Active       Ambulation/movement       Patient will ascend/descend stairs without complaints of lateral hip pain.       Start:  05/09/25    Expected End:  06/20/25               Changing body position       Patient will demonstrate ability to squat with appropriate mechanics for >10 reps to improve tolerance to work tasks.       Start:  05/09/25    Expected End:  06/20/25               Functional outcome       Patient stated goal: Improve pain with ADL's and work tasks        Start:  05/09/25    Expected End:  06/20/25               Home activities       Patient will report hip pain levels while working </= 2/10 to return to pain-free working.        Start:  05/09/25    Expected End:  06/20/25               Pain       Patient will report pain of <2/10 demonstrating a reduction of overall pain       Start:  05/09/25    Expected End:  05/23/25               Range of Motion       Patient will achieve right hip flexion  degrees       Start:  05/09/25    Expected End:  05/23/25               Strength       Patient will achieve right hip flexion strength of 5/5       Start:  05/09/25    Expected End:  06/20/25            Patient will achieve right hip abduction strength of 5/5       Start:  05/09/25    Expected End:  06/20/25               Working       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       Start:  05/09/25    Expected End:  06/20/25                Laron Tanner, PT

## 2025-07-28 ENCOUNTER — OFFICE VISIT (OUTPATIENT)
Dept: FAMILY MEDICINE | Facility: CLINIC | Age: 60
End: 2025-07-28
Payer: COMMERCIAL

## 2025-07-28 VITALS
HEART RATE: 81 BPM | HEIGHT: 62 IN | DIASTOLIC BLOOD PRESSURE: 72 MMHG | OXYGEN SATURATION: 97 % | WEIGHT: 156.06 LBS | BODY MASS INDEX: 28.72 KG/M2 | SYSTOLIC BLOOD PRESSURE: 102 MMHG

## 2025-07-28 DIAGNOSIS — R35.0 URINE FREQUENCY: ICD-10-CM

## 2025-07-28 DIAGNOSIS — K59.04 CHRONIC IDIOPATHIC CONSTIPATION: ICD-10-CM

## 2025-07-28 DIAGNOSIS — Z12.31 ENCOUNTER FOR SCREENING MAMMOGRAM FOR MALIGNANT NEOPLASM OF BREAST: ICD-10-CM

## 2025-07-28 DIAGNOSIS — R13.19 ESOPHAGEAL DYSPHAGIA: ICD-10-CM

## 2025-07-28 DIAGNOSIS — Z00.01 ENCOUNTER FOR GENERAL ADULT MEDICAL EXAMINATION WITH ABNORMAL FINDINGS: Primary | ICD-10-CM

## 2025-07-28 DIAGNOSIS — E78.2 MODERATE MIXED HYPERLIPIDEMIA NOT REQUIRING STATIN THERAPY: ICD-10-CM

## 2025-07-28 DIAGNOSIS — R73.03 PREDIABETES: ICD-10-CM

## 2025-07-28 DIAGNOSIS — K21.9 GERD WITHOUT ESOPHAGITIS: ICD-10-CM

## 2025-07-28 PROCEDURE — 99999 PR PBB SHADOW E&M-EST. PATIENT-LVL IV: CPT | Mod: PBBFAC,,,

## 2025-07-28 PROCEDURE — 3078F DIAST BP <80 MM HG: CPT | Mod: CPTII,S$GLB,,

## 2025-07-28 PROCEDURE — 3008F BODY MASS INDEX DOCD: CPT | Mod: CPTII,S$GLB,,

## 2025-07-28 PROCEDURE — G2211 COMPLEX E/M VISIT ADD ON: HCPCS | Mod: S$GLB,,,

## 2025-07-28 PROCEDURE — 1159F MED LIST DOCD IN RCRD: CPT | Mod: CPTII,S$GLB,,

## 2025-07-28 PROCEDURE — 3074F SYST BP LT 130 MM HG: CPT | Mod: CPTII,S$GLB,,

## 2025-07-28 PROCEDURE — 99214 OFFICE O/P EST MOD 30 MIN: CPT | Mod: S$GLB,,,

## 2025-07-28 RX ORDER — FAMOTIDINE 40 MG/1
40 TABLET, FILM COATED ORAL DAILY
Qty: 30 TABLET | Refills: 11 | Status: SHIPPED | OUTPATIENT
Start: 2025-07-28 | End: 2025-07-28 | Stop reason: CLARIF

## 2025-07-28 RX ORDER — OMEPRAZOLE 20 MG/1
20 CAPSULE, DELAYED RELEASE ORAL DAILY
Qty: 90 CAPSULE | Refills: 3 | Status: SHIPPED | OUTPATIENT
Start: 2025-07-28 | End: 2025-07-28

## 2025-07-28 NOTE — PROGRESS NOTES
[facility]    7/28/2025      Subjective:       Patient ID:  Megan is a 60 y.o. female .  has a past medical history of Postmenopausal bleeding and Thyroid disease.    History of Present Illness    CHIEF COMPLAINT:  Ms. Hurtado presents today for annual exam and abdominal distension.    GASTROINTESTINAL:  She reports nightly abdominal distension and discomfort, particularly after taking two tablespoons of Benefiber before sleep. She has chronic constipation with her last bowel movement being soft after taking Dulcolax. She previously took TruLance which helped reduce bloating and improved bowel movements. She currently has difficulty with bowel regularity and associated abdominal discomfort. She has a history of GERD which is currently asymptomatic.    GENITOURINARY:  She reports malodorous urine and increased nocturnal urinary frequency but denies discharge or other worsening symptoms .    DIET AND WEIGHT MANAGEMENT:  She reports increased hunger and anxiety leading to night eating, typically consuming beans, eggs, and tortillas. She drinks approximately 10 cups of water daily. She expresses frustration with difficulty losing weight despite reducing carbohydrate intake and dietary modifications.      EXERCISE:  She performs only prescribed stretching exercises with no additional physical activity.      ROS:  General: -fever, -chills, -fatigue, -weight gain, -weight loss  Eyes: -vision changes, -redness, -discharge  ENT: -ear pain, -nasal congestion, -sore throat  Cardiovascular: -chest pain, -palpitations, -lower extremity edema  Respiratory: -cough, -shortness of breath  Gastrointestinal: -abdominal pain, -nausea, -vomiting, -diarrhea, +constipation, -blood in stool, +abdominal distention  Genitourinary: -dysuria, -hematuria, -frequency, +abnormal urine odor, +nocturia  Musculoskeletal: -joint pain, -muscle pain  Skin: -rash, -lesion  Neurological: -headache, -dizziness,  -numbness, -tingling  Psychiatric: +anxiety, -depression, -sleep difficulty           Problem List[1]      Last HgbA1C:    Lab Results   Component Value Date    HGBA1C 5.6 07/23/2024    HGBA1C 5.5 04/21/2023    HGBA1C 5.7 (H) 02/19/2022         Last Lipid Panel:    Lab Results   Component Value Date    HDL 41 07/23/2024    HDL 44 04/21/2023    HDL 50 02/19/2022       Lab Results   Component Value Date    LDLCALC 93.0 07/23/2024    LDLCALC 124.2 04/21/2023    LDLCALC 124.6 02/19/2022       Lab Results   Component Value Date    TRIG 230 (H) 07/23/2024    TRIG 109 04/21/2023    TRIG 107 02/19/2022       Lab Results   Component Value Date    CHOLHDL 22.8 07/23/2024    CHOLHDL 23.2 04/21/2023    CHOLHDL 25.5 02/19/2022         Review of patient's allergies indicates:   Allergen Reactions    Vitamin a Rash        Medication List with Changes/Refills   New Medications    PLECANATIDE (TRULANCE) 3 MG TAB    Take 1 tablet (3 mg total) by mouth once daily.   Current Medications    LEVOTHYROXINE (SYNTHROID) 50 MCG TABLET    Take 1 tablet (50 mcg total) by mouth before breakfast.   Discontinued Medications    CETIRIZINE (ZYRTEC) 10 MG TABLET    Take 1 tablet (10 mg total) by mouth once daily.    DIPHENHYDRAMINE (BENADRYL) 25 MG CAPSULE    Take 1 capsule (25 mg total) by mouth every 6 (six) hours as needed for Itching or Allergies.    ERGOCALCIFEROL (ERGOCALCIFEROL) 50,000 UNIT CAP    Take 50,000 Units by mouth every 7 days.    FAMOTIDINE (PEPCID) 40 MG TABLET    Take 1 tablet (40 mg total) by mouth once daily.    HYDROCORTISONE 2.5 % CREAM    Apply topically 2 (two) times daily.    LINACLOTIDE (LINZESS) 145 MCG CAP CAPSULE    Take 1 capsule (145 mcg total) by mouth before breakfast.    MUPIROCIN (BACTROBAN) 2 % OINTMENT    Apply topically 3 (three) times daily.    PANTOPRAZOLE (PROTONIX) 40 MG TABLET    Take 1 tablet (40 mg total) by mouth once daily.    PHENYLEPHRINE-COCOA BUTTER 0.25-88.44 % SUPP SUPPOSITORY    Place 1  "suppository rectally 2 (two) times a day.    SILVER SULFADIAZINE 1% (SILVADENE) 1 % CREAM    Apply topically 2 (two) times daily.               Objective:      /72 (BP Location: Left arm, Patient Position: Sitting)   Pulse 81   Ht 5' 2" (1.575 m)   Wt 70.8 kg (156 lb 1.4 oz)   SpO2 97%   BMI 28.55 kg/m²   Estimated body mass index is 28.55 kg/m² as calculated from the following:    Height as of this encounter: 5' 2" (1.575 m).    Weight as of this encounter: 70.8 kg (156 lb 1.4 oz).    Physical Exam  Vitals reviewed.   Constitutional:       Appearance: Normal appearance.   HENT:      Head: Normocephalic and atraumatic.   Eyes:      General: No scleral icterus.     Conjunctiva/sclera: Conjunctivae normal.   Cardiovascular:      Rate and Rhythm: Normal rate.   Pulmonary:      Effort: Pulmonary effort is normal. No respiratory distress.   Musculoskeletal:      Cervical back: Normal range of motion.   Skin:     Coloration: Skin is not pale.   Neurological:      Mental Status: She is alert.   Psychiatric:         Mood and Affect: Mood normal.             Assessment and Plan:   Assessment & Plan    PLAN SUMMARY:   Prescribed TruLance 3 mg tablet daily for constipation management   Ordered fasting labs for tomorrow morning at 10 AM   Ordered urine culture and additional labs for tomorrow morning at 10 AM   Follow-up in 2 weeks to assess effectiveness of TruLance for constipation and abdominal distension as well as urine frequency        FOLLOW-UP:   Scheduled follow up in 2 weeks to assess effectiveness of TruLance for constipation and abdominal distension.       1. Encounter for general adult medical examination with abnormal findings  - Comprehensive Metabolic Panel; Future  - CBC Auto Differential; Future  - Lipid Panel; Future  - Hemoglobin A1C; Future  - TSH; Future    2. Moderate mixed hyperlipidemia not requiring statin therapy  Labs 4 years ago showed mixed hyperlipidemia not requiring statin therapy " labs 1 year ago showed elevated elevated triglycerides.  3. Prediabetes   Evaluated need for fasting labs to reassess prediabetes status.   Ms. Hurtado was previously informed of prediabetic status and wants confirmation.   Ordered fasting labs to be completed tomorrow morning at 10 AM.  4. GERD without esophagitis  Patient's has been off of her PPI and Pepcid for extended period of time is not having any symptoms of GERD or esophagitis we will continue to monitor and possibly GI referral if symptoms reoccur  5. Esophageal dysphagia  Same as noted above  6. Encounter for screening mammogram for malignant neoplasm of breast  - Mammo Digital Screening Bilat w/ Margarito (XPD); Future  Routine screening is recommended as part of standard preventive care to support early detection and overall health maintenance   7. Chronic idiopathic constipation  - plecanatide (TRULANCE) 3 mg Tab; Take 1 tablet (3 mg total) by mouth once daily.  Dispense: 90 tablet; Refill: 3   Assessed the patient's constipation.   Ms. Hurtado is out of medications and does not have refills.   Reports soft bowel movements after taking Dulcolax.   Discussed medication history, including previous use of Linzess and Trulance.   Prescribed TruLance 3 mg tablet to be taken by mouth daily for constipation management based on   positive past experience.   Ms. Hurtado reports abdominal distension at night, especially after taking Benefiber.   This symptom will be monitored with the initiation of TruLance.  8. Urine frequency  - Urinalysis Microscopic  - Urine culture; Future    Ms. Hurtado reports waking up frequently during the night to urinate and notes foul odor of urine.   Ordered urine culture and additional labs to check for underlying issues related to these urinary symptoms, to be completed tomorrow morning at 10 AM.    Follow up in about 2 weeks (around 8/11/2025) for jessica Jordan PA-C for constipation and urinary symptoms.    The patient was informed of  the following statements     Emergency Care:Seek immediate medical attention in the emergency room if you experience any new or worsening symptoms, or if your current condition significantly changes or becomes more severe.  Patient Acknowledgment: Patient verbalizes understanding of the plan and agrees to proceed with the recommended care.    Follow Up:  Visit date not found   Future Appointments   Date Time Provider Department Center   7/29/2025  7:00 AM [facility] [place] [facility] [place]   7/29/2025  7:45 AM [facility] [place]   8/11/2025  1:00 PM Logan Jordan PA [facility]   9/8/2025  8:40 AM Linda Hernandez MD [facility] [place] [facility]                       Other Orders Placed This Visit:  Orders Placed This Encounter   Procedures    Urine culture    Mammo Digital Screening Bilat w/ Margarito (XPD)    Comprehensive Metabolic Panel    CBC Auto Differential    Lipid Panel    Hemoglobin A1C    TSH    Urinalysis Microscopic         This note was generated with the assistance of ambient listening technology. Verbal consent was obtained by the patient and accompanying visitor(s) for the recording of patient appointment to facilitate this note. I attest to having reviewed and edited the generated note for accuracy, though some syntax or spelling errors may persist. Please contact the author of this note for any clarification.        Logan Jordan PA-C             [1]   Patient Active Problem List  Diagnosis    Acquired hypothyroidism    GERD without esophagitis    History of colon polyps    Prediabetes    Moderate mixed hyperlipidemia not requiring statin therapy    Adjustment disorder with depressed mood    Female pattern hair loss    History of basal cell carcinoma (BCC) of skin    Chronic bilateral low back pain with right-sided sciatica    Calcaneal spur of foot, right    Right foot pain    Decreased strength    Impaired functional mobility and activity tolerance

## 2025-07-29 ENCOUNTER — LAB VISIT (OUTPATIENT)
Dept: LAB | Facility: HOSPITAL | Age: 60
End: 2025-07-29
Attending: FAMILY MEDICINE
Payer: COMMERCIAL

## 2025-07-29 DIAGNOSIS — R35.0 URINE FREQUENCY: ICD-10-CM

## 2025-07-29 DIAGNOSIS — Z00.01 ENCOUNTER FOR GENERAL ADULT MEDICAL EXAMINATION WITH ABNORMAL FINDINGS: ICD-10-CM

## 2025-07-29 LAB
ABSOLUTE EOSINOPHIL (OHS): 0.17 K/UL
ABSOLUTE MONOCYTE (OHS): 0.66 K/UL (ref 0.3–1)
ABSOLUTE NEUTROPHIL COUNT (OHS): 3.44 K/UL (ref 1.8–7.7)
ALBUMIN SERPL BCP-MCNC: 4 G/DL (ref 3.5–5.2)
ALP SERPL-CCNC: 82 UNIT/L (ref 40–150)
ALT SERPL W/O P-5'-P-CCNC: 11 UNIT/L (ref 0–55)
ANION GAP (OHS): 8 MMOL/L (ref 8–16)
AST SERPL-CCNC: 26 UNIT/L (ref 0–50)
BACTERIA #/AREA URNS AUTO: NORMAL /HPF
BASOPHILS # BLD AUTO: 0.05 K/UL
BASOPHILS NFR BLD AUTO: 0.8 %
BILIRUB SERPL-MCNC: 0.4 MG/DL (ref 0.1–1)
BUN SERPL-MCNC: 7 MG/DL (ref 6–20)
CALCIUM SERPL-MCNC: 9.3 MG/DL (ref 8.7–10.5)
CHLORIDE SERPL-SCNC: 107 MMOL/L (ref 95–110)
CHOLEST SERPL-MCNC: 186 MG/DL (ref 120–199)
CHOLEST/HDLC SERPL: 4.4 {RATIO} (ref 2–5)
CO2 SERPL-SCNC: 24 MMOL/L (ref 23–29)
CREAT SERPL-MCNC: 0.6 MG/DL (ref 0.5–1.4)
EAG (OHS): 111 MG/DL (ref 68–131)
ERYTHROCYTE [DISTWIDTH] IN BLOOD BY AUTOMATED COUNT: 13.2 % (ref 11.5–14.5)
GFR SERPLBLD CREATININE-BSD FMLA CKD-EPI: >60 ML/MIN/1.73/M2
GLUCOSE SERPL-MCNC: 85 MG/DL (ref 70–110)
HBA1C MFR BLD: 5.5 % (ref 4–5.6)
HCT VFR BLD AUTO: 39.6 % (ref 37–48.5)
HDLC SERPL-MCNC: 42 MG/DL (ref 40–75)
HDLC SERPL: 22.6 % (ref 20–50)
HGB BLD-MCNC: 12.8 GM/DL (ref 12–16)
IMM GRANULOCYTES # BLD AUTO: 0.03 K/UL (ref 0–0.04)
IMM GRANULOCYTES NFR BLD AUTO: 0.5 % (ref 0–0.5)
LDLC SERPL CALC-MCNC: 111 MG/DL (ref 63–159)
LYMPHOCYTES # BLD AUTO: 2.22 K/UL (ref 1–4.8)
MCH RBC QN AUTO: 28.7 PG (ref 27–31)
MCHC RBC AUTO-ENTMCNC: 32.3 G/DL (ref 32–36)
MCV RBC AUTO: 89 FL (ref 82–98)
MICROSCOPIC COMMENT: NORMAL
NONHDLC SERPL-MCNC: 144 MG/DL
NUCLEATED RBC (/100WBC) (OHS): 0 /100 WBC
PLATELET # BLD AUTO: 244 K/UL (ref 150–450)
PMV BLD AUTO: 10.7 FL (ref 9.2–12.9)
POTASSIUM SERPL-SCNC: 3.9 MMOL/L (ref 3.5–5.1)
PROT SERPL-MCNC: 7.2 GM/DL (ref 6–8.4)
RBC # BLD AUTO: 4.46 M/UL (ref 4–5.4)
RELATIVE EOSINOPHIL (OHS): 2.6 %
RELATIVE LYMPHOCYTE (OHS): 33.8 % (ref 18–48)
RELATIVE MONOCYTE (OHS): 10 % (ref 4–15)
RELATIVE NEUTROPHIL (OHS): 52.3 % (ref 38–73)
SODIUM SERPL-SCNC: 139 MMOL/L (ref 136–145)
TRIGL SERPL-MCNC: 165 MG/DL (ref 30–150)
TSH SERPL-ACNC: 2.38 UIU/ML (ref 0.4–4)
WBC # BLD AUTO: 6.57 K/UL (ref 3.9–12.7)
WBC #/AREA URNS AUTO: 1 /HPF (ref 0–5)

## 2025-07-29 PROCEDURE — 82465 ASSAY BLD/SERUM CHOLESTEROL: CPT

## 2025-07-29 PROCEDURE — 80053 COMPREHEN METABOLIC PANEL: CPT

## 2025-07-29 PROCEDURE — 36415 COLL VENOUS BLD VENIPUNCTURE: CPT | Mod: PO

## 2025-07-29 PROCEDURE — 84443 ASSAY THYROID STIM HORMONE: CPT

## 2025-07-29 PROCEDURE — 81001 URINALYSIS AUTO W/SCOPE: CPT

## 2025-07-29 PROCEDURE — 87086 URINE CULTURE/COLONY COUNT: CPT

## 2025-07-29 PROCEDURE — 85025 COMPLETE CBC W/AUTO DIFF WBC: CPT

## 2025-07-29 PROCEDURE — 83036 HEMOGLOBIN GLYCOSYLATED A1C: CPT

## 2025-07-30 ENCOUNTER — RESULTS FOLLOW-UP (OUTPATIENT)
Dept: FAMILY MEDICINE | Facility: CLINIC | Age: 60
End: 2025-07-30
Payer: COMMERCIAL

## 2025-07-30 DIAGNOSIS — N30.00 ACUTE CYSTITIS WITHOUT HEMATURIA: Primary | ICD-10-CM

## 2025-07-31 LAB — BACTERIA UR CULT: ABNORMAL

## 2025-08-01 RX ORDER — NITROFURANTOIN 25; 75 MG/1; MG/1
100 CAPSULE ORAL 2 TIMES DAILY
Qty: 10 CAPSULE | Refills: 0 | Status: SHIPPED | OUTPATIENT
Start: 2025-08-01 | End: 2025-08-06

## 2025-08-11 ENCOUNTER — OFFICE VISIT (OUTPATIENT)
Dept: FAMILY MEDICINE | Facility: CLINIC | Age: 60
End: 2025-08-11
Payer: COMMERCIAL

## 2025-08-11 VITALS
HEIGHT: 62 IN | WEIGHT: 151 LBS | OXYGEN SATURATION: 98 % | HEART RATE: 79 BPM | DIASTOLIC BLOOD PRESSURE: 76 MMHG | BODY MASS INDEX: 27.79 KG/M2 | SYSTOLIC BLOOD PRESSURE: 102 MMHG

## 2025-08-11 DIAGNOSIS — R13.19 ESOPHAGEAL DYSPHAGIA: ICD-10-CM

## 2025-08-11 DIAGNOSIS — K59.04 CHRONIC IDIOPATHIC CONSTIPATION: Primary | ICD-10-CM

## 2025-08-11 DIAGNOSIS — R14.0 ABDOMINAL BLOATING: ICD-10-CM

## 2025-08-11 DIAGNOSIS — Z12.31 ENCOUNTER FOR SCREENING MAMMOGRAM FOR MALIGNANT NEOPLASM OF BREAST: ICD-10-CM

## 2025-08-11 DIAGNOSIS — Z00.01 ENCOUNTER FOR GENERAL ADULT MEDICAL EXAMINATION WITH ABNORMAL FINDINGS: ICD-10-CM

## 2025-08-11 DIAGNOSIS — N39.41 URGE INCONTINENCE OF URINE: ICD-10-CM

## 2025-08-11 PROCEDURE — 3044F HG A1C LEVEL LT 7.0%: CPT | Mod: CPTII,S$GLB,,

## 2025-08-11 PROCEDURE — 99999 PR PBB SHADOW E&M-EST. PATIENT-LVL IV: CPT | Mod: PBBFAC,,,

## 2025-08-11 PROCEDURE — 1159F MED LIST DOCD IN RCRD: CPT | Mod: CPTII,S$GLB,,

## 2025-08-11 PROCEDURE — 3074F SYST BP LT 130 MM HG: CPT | Mod: CPTII,S$GLB,,

## 2025-08-11 PROCEDURE — 99214 OFFICE O/P EST MOD 30 MIN: CPT | Mod: S$GLB,,,

## 2025-08-11 PROCEDURE — 1160F RVW MEDS BY RX/DR IN RCRD: CPT | Mod: CPTII,S$GLB,,

## 2025-08-11 PROCEDURE — 3008F BODY MASS INDEX DOCD: CPT | Mod: CPTII,S$GLB,,

## 2025-08-11 PROCEDURE — G2211 COMPLEX E/M VISIT ADD ON: HCPCS | Mod: S$GLB,,,

## 2025-08-11 PROCEDURE — 3078F DIAST BP <80 MM HG: CPT | Mod: CPTII,S$GLB,,

## 2025-08-11 RX ORDER — OMEPRAZOLE 20 MG/1
20 CAPSULE, DELAYED RELEASE ORAL DAILY
Qty: 90 CAPSULE | Refills: 3 | Status: SHIPPED | OUTPATIENT
Start: 2025-08-11 | End: 2026-08-11

## 2025-09-05 ENCOUNTER — TELEPHONE (OUTPATIENT)
Dept: SURGERY | Facility: CLINIC | Age: 60
End: 2025-09-05
Payer: COMMERCIAL